# Patient Record
Sex: FEMALE | Race: WHITE | Employment: OTHER | URBAN - METROPOLITAN AREA
[De-identification: names, ages, dates, MRNs, and addresses within clinical notes are randomized per-mention and may not be internally consistent; named-entity substitution may affect disease eponyms.]

---

## 2018-09-24 ENCOUNTER — HOSPITAL ENCOUNTER (EMERGENCY)
Age: 83
Discharge: HOME | End: 2018-09-24
Attending: EMERGENCY MEDICINE
Payer: MEDICARE

## 2018-09-24 VITALS
WEIGHT: 125 LBS | TEMPERATURE: 98.6 F | OXYGEN SATURATION: 100 % | HEART RATE: 79 BPM | HEIGHT: 57 IN | RESPIRATION RATE: 18 BRPM | BODY MASS INDEX: 26.97 KG/M2 | DIASTOLIC BLOOD PRESSURE: 84 MMHG | SYSTOLIC BLOOD PRESSURE: 110 MMHG

## 2018-09-24 DIAGNOSIS — R11.2 NON-INTRACTABLE VOMITING WITH NAUSEA, UNSPECIFIED VOMITING TYPE: Primary | ICD-10-CM

## 2018-09-24 LAB
ALBUMIN SERPL-MCNC: 4.2 G/DL (ref 3.5–5)
ALBUMIN/GLOB SERPL: 1.1 {RATIO} (ref 1.1–2.2)
ALP SERPL-CCNC: 87 U/L (ref 45–117)
ALT SERPL-CCNC: 26 U/L (ref 12–78)
ANION GAP SERPL CALC-SCNC: 11 MMOL/L (ref 5–15)
AST SERPL-CCNC: 33 U/L (ref 15–37)
BASOPHILS # BLD: 0 K/UL (ref 0–0.1)
BASOPHILS NFR BLD: 0 % (ref 0–1)
BILIRUB SERPL-MCNC: 0.7 MG/DL (ref 0.2–1)
BUN SERPL-MCNC: 33 MG/DL (ref 6–20)
BUN/CREAT SERPL: 27 (ref 12–20)
CALCIUM SERPL-MCNC: 9.7 MG/DL (ref 8.5–10.1)
CHLORIDE SERPL-SCNC: 98 MMOL/L (ref 97–108)
CO2 SERPL-SCNC: 30 MMOL/L (ref 21–32)
COMMENT, HOLDF: NORMAL
CREAT SERPL-MCNC: 1.22 MG/DL (ref 0.55–1.02)
DIFFERENTIAL METHOD BLD: ABNORMAL
EOSINOPHIL # BLD: 0 K/UL (ref 0–0.4)
EOSINOPHIL NFR BLD: 0 % (ref 0–7)
ERYTHROCYTE [DISTWIDTH] IN BLOOD BY AUTOMATED COUNT: 12.9 % (ref 11.5–14.5)
GLOBULIN SER CALC-MCNC: 3.9 G/DL (ref 2–4)
GLUCOSE SERPL-MCNC: 137 MG/DL (ref 65–100)
HCT VFR BLD AUTO: 43.6 % (ref 35–47)
HGB BLD-MCNC: 15.1 G/DL (ref 11.5–16)
IMM GRANULOCYTES # BLD: 0 K/UL (ref 0–0.04)
IMM GRANULOCYTES NFR BLD AUTO: 0 % (ref 0–0.5)
LIPASE SERPL-CCNC: 407 U/L (ref 73–393)
LYMPHOCYTES # BLD: 1.1 K/UL (ref 0.8–3.5)
LYMPHOCYTES NFR BLD: 9 % (ref 12–49)
MCH RBC QN AUTO: 33 PG (ref 26–34)
MCHC RBC AUTO-ENTMCNC: 34.6 G/DL (ref 30–36.5)
MCV RBC AUTO: 95.4 FL (ref 80–99)
MONOCYTES # BLD: 0.7 K/UL (ref 0–1)
MONOCYTES NFR BLD: 6 % (ref 5–13)
NEUTS SEG # BLD: 10.7 K/UL (ref 1.8–8)
NEUTS SEG NFR BLD: 85 % (ref 32–75)
NRBC # BLD: 0 K/UL (ref 0–0.01)
NRBC BLD-RTO: 0 PER 100 WBC
PLATELET # BLD AUTO: 213 K/UL (ref 150–400)
PMV BLD AUTO: 10.7 FL (ref 8.9–12.9)
POTASSIUM SERPL-SCNC: 3.7 MMOL/L (ref 3.5–5.1)
PROT SERPL-MCNC: 8.1 G/DL (ref 6.4–8.2)
RBC # BLD AUTO: 4.57 M/UL (ref 3.8–5.2)
SAMPLES BEING HELD,HOLD: NORMAL
SODIUM SERPL-SCNC: 139 MMOL/L (ref 136–145)
WBC # BLD AUTO: 12.6 K/UL (ref 3.6–11)

## 2018-09-24 PROCEDURE — 96374 THER/PROPH/DIAG INJ IV PUSH: CPT

## 2018-09-24 PROCEDURE — 85025 COMPLETE CBC W/AUTO DIFF WBC: CPT | Performed by: EMERGENCY MEDICINE

## 2018-09-24 PROCEDURE — 83690 ASSAY OF LIPASE: CPT | Performed by: EMERGENCY MEDICINE

## 2018-09-24 PROCEDURE — 80053 COMPREHEN METABOLIC PANEL: CPT | Performed by: EMERGENCY MEDICINE

## 2018-09-24 PROCEDURE — 96361 HYDRATE IV INFUSION ADD-ON: CPT

## 2018-09-24 PROCEDURE — 74011250636 HC RX REV CODE- 250/636: Performed by: EMERGENCY MEDICINE

## 2018-09-24 PROCEDURE — 99282 EMERGENCY DEPT VISIT SF MDM: CPT

## 2018-09-24 RX ORDER — ONDANSETRON 2 MG/ML
4 INJECTION INTRAMUSCULAR; INTRAVENOUS
Status: COMPLETED | OUTPATIENT
Start: 2018-09-24 | End: 2018-09-24

## 2018-09-24 RX ORDER — ONDANSETRON 4 MG/1
4 TABLET, ORALLY DISINTEGRATING ORAL
Qty: 10 TAB | Refills: 0 | Status: SHIPPED | OUTPATIENT
Start: 2018-09-24 | End: 2022-09-22

## 2018-09-24 RX ADMIN — ONDANSETRON 4 MG: 2 INJECTION INTRAMUSCULAR; INTRAVENOUS at 22:11

## 2018-09-24 RX ADMIN — SODIUM CHLORIDE 1000 ML: 900 INJECTION, SOLUTION INTRAVENOUS at 22:11

## 2018-09-25 NOTE — ED PROVIDER NOTES
HPI Comments: 80 y.o. female with past medical history significant for BBB and heart failure who presents from home with chief complaint of vomiting. Patient states that she had constipation earlier today and took dulcolax for her symptoms. She comments that she should not have taken dulcolax with her PPI medicine as she had subsequent diarrhea and vomiting. She notes that she has had several episodes of simultaneous vomiting and diarrhea since 10:30 this morning. She claims her last episode of vomiting was over 4 hours ago. Patient also complains of associated chills, shakiness, diaphoresis, and pain at the bottom of her ribs that was present before and after her vomiting episodes. She mentions that she has been unable to eat or drink anything, including her Glucerna, today due to her symptoms. Patient currently denies nausea, rib pain, diaphoresis, and abdominal pain but is fatigued and has slight pain in her back. Patient denies hematochezia, blood in stool, hematemesis, headache, and leg pain. There are no other acute medical concerns at this time. Social hx: negative tobacco, alcohol, and drug use  PCP: Moraima Méndez MD    Note written by Bhavin Mcnamara, as dictated by Bernard Briscoe MD 9:41 PM          The history is provided by the patient and a relative. No  was used. Past Medical History:   Diagnosis Date    BBB (bundle branch block)     Heart failure ([facility] [address]     followed by Dr Albino Segal       Past Surgical History:   Procedure Laterality Date    CARDIAC SURG PROCEDURE UNLIST      stent placed     HX APPENDECTOMY      HX  SECTION      x 3    HX HEENT      both ears    HX PACEMAKER           History reviewed. No pertinent family history. Social History     Social History    Marital status:      Spouse name: N/A    Number of children: N/A    Years of education: N/A     Occupational History    Not on file.      Social History Main Topics    Smoking status: Never Smoker    Smokeless tobacco: Not on file    Alcohol use No    Drug use: No    Sexual activity: Not on file     Other Topics Concern    Not on file     Social History Narrative         ALLERGIES: Adhesive; Clindamycin; Hydrocodone; and Pcn [penicillins]    Review of Systems   Constitutional: Positive for chills and diaphoresis. Negative for activity change and fever. HENT: Negative for nosebleeds, sore throat, trouble swallowing and voice change. Eyes: Negative for visual disturbance. Respiratory: Negative for shortness of breath. Cardiovascular: Negative for chest pain and palpitations. Gastrointestinal: Positive for diarrhea, nausea and vomiting. Negative for abdominal pain, anal bleeding, blood in stool and constipation. Genitourinary: Negative for difficulty urinating, dysuria, hematuria and urgency. Musculoskeletal: Negative for back pain, neck pain and neck stiffness. Skin: Negative for color change. Allergic/Immunologic: Negative for immunocompromised state. Neurological: Negative for dizziness, seizures, syncope, weakness, light-headedness, numbness and headaches. Psychiatric/Behavioral: Negative for behavioral problems, confusion, hallucinations, self-injury and suicidal ideas. All other systems reviewed and are negative. Vitals:    09/24/18 1844   BP: 105/62   Pulse: 72   Resp: 16   Temp: 98.2 °F (36.8 °C)   SpO2: 98%   Weight: 56.7 kg (125 lb)   Height: 4' 9\" (1.448 m)            Physical Exam   Constitutional: She is oriented to person, place, and time. She appears well-developed and well-nourished. No distress. Elderly female; no acute distress   HENT:   Head: Normocephalic and atraumatic. Eyes: Pupils are equal, round, and reactive to light. Scleral icterus is present. Neck: Normal range of motion. Neck supple. Cardiovascular: Normal rate, regular rhythm and normal heart sounds.   Exam reveals no gallop and no friction rub. No murmur heard. Pulmonary/Chest: Effort normal and breath sounds normal. No respiratory distress. She has no wheezes. Clear breath sounds   Abdominal: Soft. Bowel sounds are normal. She exhibits no distension. There is no tenderness. There is no rebound and no guarding. Musculoskeletal: Normal range of motion. Neurological: She is alert and oriented to person, place, and time. Skin: Skin is warm. No rash noted. She is not diaphoretic. Profound jaundice   Psychiatric: She has a normal mood and affect. Her behavior is normal. Judgment and thought content normal.   Nursing note and vitals reviewed. Note written by Barbara Delgadillo, as dictated by Alistair Cox MD 9:41 PM    MDM  Number of Diagnoses or Management Options        ED Course     This is in a 43-year-old female with past medical history, review of systems, physical exam as above, presenting with complaints of increasing confusion, jaundice, scleral icterus, the setting of recently diagnosed biliary tree obstruction. Patient's daughter states his symptoms have been worsening over the last 2 weeks, states she was evaluated by her primary care physician, who ordered laboratory imaging, indicating elevated bilirubin, and CT findings of biliary obstruction, without conclusive cause. She is scheduled to have endoscopy in 2 days, however the daughter states the patient's symptoms seem to be worsening. Physical exam is remarkable for elderly female, in no acute distress, with profound jaundice, scleral icterus, otherwise unremarkable with clear breath sounds, soft nontender abdomen. Suspect worsening biliary process. Plan to obtain CMP, CBC, UA, lipase, and right upper quadrant ultrasound. We will reevaluate, and make a disposition based the patient's diagnostics and response to therapy, however she is likely going to require mission for further care and evaluation.     Procedures    11:02 PM  Patient with no further episodes of nausea, vomiting, diarrhea, tolerating PO w/o difficulty. Suspect viral gastroenteritis. Mildly elevated lipase however nontender abd. Will Dc home with antiemetics, PCP follow up and return precautions.

## 2018-09-25 NOTE — DISCHARGE INSTRUCTIONS
Nausea and Vomiting: Care Instructions  Your Care Instructions    When you are nauseated, you may feel weak and sweaty and notice a lot of saliva in your mouth. Nausea often leads to vomiting. Most of the time you do not need to worry about nausea and vomiting, but they can be signs of other illnesses. Two common causes of nausea and vomiting are stomach flu and food poisoning. Nausea and vomiting from viral stomach flu will usually start to improve within 24 hours. Nausea and vomiting from food poisoning may last from 12 to 48 hours. The doctor has checked you carefully, but problems can develop later. If you notice any problems or new symptoms, get medical treatment right away. Follow-up care is a key part of your treatment and safety. Be sure to make and go to all appointments, and call your doctor if you are having problems. It's also a good idea to know your test results and keep a list of the medicines you take. How can you care for yourself at home? · To prevent dehydration, drink plenty of fluids, enough so that your urine is light yellow or clear like water. Choose water and other caffeine-free clear liquids until you feel better. If you have kidney, heart, or liver disease and have to limit fluids, talk with your doctor before you increase the amount of fluids you drink. · Rest in bed until you feel better. · When you are able to eat, try clear soups, mild foods, and liquids until all symptoms are gone for 12 to 48 hours. Other good choices include dry toast, crackers, cooked cereal, and gelatin dessert, such as Jell-O. When should you call for help? Call 911 anytime you think you may need emergency care. For example, call if:    · You passed out (lost consciousness).    Call your doctor now or seek immediate medical care if:    · You have symptoms of dehydration, such as:  ¨ Dry eyes and a dry mouth. ¨ Passing only a little dark urine.   ¨ Feeling thirstier than usual.     · You have new or worsening belly pain.     · You have a new or higher fever.     · You vomit blood or what looks like coffee grounds.    Watch closely for changes in your health, and be sure to contact your doctor if:    · You have ongoing nausea and vomiting.     · Your vomiting is getting worse.     · Your vomiting lasts longer than 2 days.     · You are not getting better as expected. Where can you learn more? Go to http://laura-grover.info/. Enter 25 580702 in the search box to learn more about \"Nausea and Vomiting: Care Instructions. \"  Current as of: November 20, 2017  Content Version: 11.7  © 4501-2030 [employer]. Care instructions adapted under license by [employer] (which disclaims liability or warranty for this information). If you have questions about a medical condition or this instruction, always ask your healthcare professional. [address] any warranty or liability for your use of this information.

## 2018-12-15 ENCOUNTER — HOSPITAL ENCOUNTER (EMERGENCY)
Age: 83
Discharge: HOME | End: 2018-12-15
Attending: EMERGENCY MEDICINE
Payer: MEDICARE

## 2018-12-15 VITALS
TEMPERATURE: 97.9 F | DIASTOLIC BLOOD PRESSURE: 23 MMHG | OXYGEN SATURATION: 100 % | WEIGHT: 123 LBS | RESPIRATION RATE: 21 BRPM | HEART RATE: 80 BPM | SYSTOLIC BLOOD PRESSURE: 95 MMHG | HEIGHT: 57 IN | BODY MASS INDEX: 26.54 KG/M2

## 2018-12-15 DIAGNOSIS — R55 SYNCOPE AND COLLAPSE: Primary | ICD-10-CM

## 2018-12-15 LAB
ALBUMIN SERPL-MCNC: 3.2 G/DL (ref 3.5–5)
ALBUMIN/GLOB SERPL: 0.9 {RATIO} (ref 1.1–2.2)
ALP SERPL-CCNC: 72 U/L (ref 45–117)
ALT SERPL-CCNC: 18 U/L (ref 12–78)
ANION GAP SERPL CALC-SCNC: 7 MMOL/L (ref 5–15)
AST SERPL-CCNC: 20 U/L (ref 15–37)
BASOPHILS # BLD: 0 K/UL (ref 0–0.1)
BASOPHILS NFR BLD: 0 % (ref 0–1)
BILIRUB SERPL-MCNC: 0.5 MG/DL (ref 0.2–1)
BUN SERPL-MCNC: 24 MG/DL (ref 6–20)
BUN/CREAT SERPL: 21 (ref 12–20)
CALCIUM SERPL-MCNC: 9 MG/DL (ref 8.5–10.1)
CHLORIDE SERPL-SCNC: 104 MMOL/L (ref 97–108)
CO2 SERPL-SCNC: 27 MMOL/L (ref 21–32)
CREAT SERPL-MCNC: 1.17 MG/DL (ref 0.55–1.02)
DIFFERENTIAL METHOD BLD: NORMAL
EOSINOPHIL # BLD: 0.1 K/UL (ref 0–0.4)
EOSINOPHIL NFR BLD: 2 % (ref 0–7)
ERYTHROCYTE [DISTWIDTH] IN BLOOD BY AUTOMATED COUNT: 12.7 % (ref 11.5–14.5)
GLOBULIN SER CALC-MCNC: 3.4 G/DL (ref 2–4)
GLUCOSE SERPL-MCNC: 101 MG/DL (ref 65–100)
HCT VFR BLD AUTO: 37.8 % (ref 35–47)
HGB BLD-MCNC: 12.9 G/DL (ref 11.5–16)
IMM GRANULOCYTES # BLD: 0 K/UL (ref 0–0.04)
IMM GRANULOCYTES NFR BLD AUTO: 0 % (ref 0–0.5)
LYMPHOCYTES # BLD: 1.8 K/UL (ref 0.8–3.5)
LYMPHOCYTES NFR BLD: 26 % (ref 12–49)
MCH RBC QN AUTO: 32.3 PG (ref 26–34)
MCHC RBC AUTO-ENTMCNC: 34.1 G/DL (ref 30–36.5)
MCV RBC AUTO: 94.7 FL (ref 80–99)
MONOCYTES # BLD: 0.7 K/UL (ref 0–1)
MONOCYTES NFR BLD: 11 % (ref 5–13)
NEUTS SEG # BLD: 4.2 K/UL (ref 1.8–8)
NEUTS SEG NFR BLD: 61 % (ref 32–75)
NRBC # BLD: 0 K/UL (ref 0–0.01)
NRBC BLD-RTO: 0 PER 100 WBC
PLATELET # BLD AUTO: 209 K/UL (ref 150–400)
PMV BLD AUTO: 10.7 FL (ref 8.9–12.9)
POTASSIUM SERPL-SCNC: 4.2 MMOL/L (ref 3.5–5.1)
PROT SERPL-MCNC: 6.6 G/DL (ref 6.4–8.2)
RBC # BLD AUTO: 3.99 M/UL (ref 3.8–5.2)
SODIUM SERPL-SCNC: 138 MMOL/L (ref 136–145)
TROPONIN I SERPL-MCNC: <0.05 NG/ML
WBC # BLD AUTO: 6.9 K/UL (ref 3.6–11)

## 2018-12-15 PROCEDURE — 80053 COMPREHEN METABOLIC PANEL: CPT

## 2018-12-15 PROCEDURE — 99284 EMERGENCY DEPT VISIT MOD MDM: CPT

## 2018-12-15 PROCEDURE — 85025 COMPLETE CBC W/AUTO DIFF WBC: CPT

## 2018-12-15 PROCEDURE — 84484 ASSAY OF TROPONIN QUANT: CPT

## 2018-12-15 PROCEDURE — 36415 COLL VENOUS BLD VENIPUNCTURE: CPT

## 2018-12-15 PROCEDURE — 93005 ELECTROCARDIOGRAM TRACING: CPT

## 2018-12-15 NOTE — ED NOTES
Pt presents to ER via ems with c/o syncopal episode while standing in grocery store line. Pt did have loc and does not remember, was caught by a staff member and laid down to the ground.  states this has happened before in the past when she is overworked, states they did a lot of shopping this morning. .  Denies any pain

## 2018-12-15 NOTE — ED NOTES
Discharge instructions reviewed with patient, copy given by Dr. Daniele Beth. Pt is accomponied by family, denies use of wheelchair.

## 2018-12-15 NOTE — ED PROVIDER NOTES
EMERGENCY DEPARTMENT HISTORY AND PHYSICAL EXAM      Date: 12/15/2018  Patient Name: Katharina Rosen    History of Presenting Illness     Chief Complaint   Patient presents with    Syncope       History Provided By: Patient and Patient's Son    HPI: Katharina Rosen, 80 y.o. female with PMHx significant for CHF, recurrent syncope, presents via EMS to the ED with cc of syncope PTA. Pt reports that she was in the grocery store when she passed out, but was caught mid-fall by the grocery store pharmacist; pt did not hit her head. Pt denies feeling SOB, nauseous, dizzy, or having CP. Son reports pt has hx of frequent syncope, which she has had extensive work-up by her cardiologist, and has a baseline of low BP. Per grocery store protocol, pharmacist called EMS. Pt initially stated that she was fine and declined EMS transport, however, when son arrived and started leading pt into his car, pt began to doze off. EMS then asked pt if she wanted to go to the ED, but she was unable to respond, so they transported her to the ED. Pt denies increased BLE swelling, hx of PE, recent illnesses, dark or bloody bowel movements    Patient states she did not want to come to the ED in the first place, given that syncope is a recurrent issue    There are no other complaints, changes, or physical findings at this time. PCP: Leanne Nation MD    Social Hx:   Social History     Tobacco Use   Smoking Status Never Smoker   ,   Social History     Substance and Sexual Activity   Alcohol Use No   ,   Social History     Substance and Sexual Activity   Drug Use No       Past History     Past Surgical History:  Past Surgical History:   Procedure Laterality Date    CARDIAC SURG PROCEDURE UNLIST  2009    stent placed     HX APPENDECTOMY      HX  SECTION      x 3    HX HEENT      both ears    HX PACEMAKER         Family History:  No family history on file. Allergies:   Allergies   Allergen Reactions    Adhesive Rash    Clindamycin Rash    Hydrocodone Other (comments)     \"felt loopy\"    Pcn [Penicillins] Rash         Review of Systems   Review of Systems   Constitutional: Negative for chills and fever. HENT: Negative for congestion, rhinorrhea and sore throat. Respiratory: Negative for cough and shortness of breath. Cardiovascular: Negative for chest pain. Gastrointestinal: Negative for abdominal pain, nausea and vomiting. Genitourinary: Negative for dysuria and urgency. Skin: Negative for rash. Neurological: Positive for syncope. Negative for dizziness, light-headedness and headaches. All other systems reviewed and are negative. Physical Exam   Physical Exam   Constitutional: She is oriented to person, place, and time. She appears well-developed and well-nourished. No distress. HENT:   Head: Normocephalic and atraumatic. Eyes: Conjunctivae and EOM are normal. Pupils are equal, round, and reactive to light. Neck: Normal range of motion. Cardiovascular: Normal rate, regular rhythm and intact distal pulses. Pulmonary/Chest: Effort normal and breath sounds normal. No stridor. No respiratory distress. Pacemaker in L chest wall   Abdominal: Soft. She exhibits no distension. There is no tenderness. Musculoskeletal: Normal range of motion. Neurological: She is alert and oriented to person, place, and time. Skin: Skin is warm and dry. Psychiatric: She has a normal mood and affect. Nursing note and vitals reviewed.       Diagnostic Study Results     Labs -     Recent Results (from the past 12 hour(s))   EKG, 12 LEAD, INITIAL    Collection Time: 12/15/18 12:53 PM   Result Value Ref Range    Ventricular Rate 79 BPM    Atrial Rate 78 BPM    QRS Duration 140 ms    Q-T Interval 492 ms    QTC Calculation (Bezet) 564 ms    Calculated R Axis -75 degrees    Calculated T Axis 107 degrees    Diagnosis       Ventricular-paced rhythm  Biventricular pacemaker detected  When compared with ECG of 06-APR-2012 08:01,  No significant change was found     CBC WITH AUTOMATED DIFF    Collection Time: 12/15/18  1:10 PM   Result Value Ref Range    WBC 6.9 3.6 - 11.0 K/uL    RBC 3.99 3.80 - 5.20 M/uL    HGB 12.9 11.5 - 16.0 g/dL    HCT 37.8 35.0 - 47.0 %    MCV 94.7 80.0 - 99.0 FL    MCH 32.3 26.0 - 34.0 PG    MCHC 34.1 30.0 - 36.5 g/dL    RDW 12.7 11.5 - 14.5 %    PLATELET 170 885 - 889 K/uL    MPV 10.7 8.9 - 12.9 FL    NRBC 0.0 0  WBC    ABSOLUTE NRBC 0.00 0.00 - 0.01 K/uL    NEUTROPHILS 61 32 - 75 %    LYMPHOCYTES 26 12 - 49 %    MONOCYTES 11 5 - 13 %    EOSINOPHILS 2 0 - 7 %    BASOPHILS 0 0 - 1 %    IMMATURE GRANULOCYTES 0 0.0 - 0.5 %    ABS. NEUTROPHILS 4.2 1.8 - 8.0 K/UL    ABS. LYMPHOCYTES 1.8 0.8 - 3.5 K/UL    ABS. MONOCYTES 0.7 0.0 - 1.0 K/UL    ABS. EOSINOPHILS 0.1 0.0 - 0.4 K/UL    ABS. BASOPHILS 0.0 0.0 - 0.1 K/UL    ABS. IMM. GRANS. 0.0 0.00 - 0.04 K/UL    DF AUTOMATED     METABOLIC PANEL, COMPREHENSIVE    Collection Time: 12/15/18  1:10 PM   Result Value Ref Range    Sodium 138 136 - 145 mmol/L    Potassium 4.2 3.5 - 5.1 mmol/L    Chloride 104 97 - 108 mmol/L    CO2 27 21 - 32 mmol/L    Anion gap 7 5 - 15 mmol/L    Glucose 101 (H) 65 - 100 mg/dL    BUN 24 (H) 6 - 20 MG/DL    Creatinine 1.17 (H) 0.55 - 1.02 MG/DL    BUN/Creatinine ratio 21 (H) 12 - 20      GFR est AA 53 (L) >60 ml/min/1.73m2    GFR est non-AA 44 (L) >60 ml/min/1.73m2    Calcium 9.0 8.5 - 10.1 MG/DL    Bilirubin, total 0.5 0.2 - 1.0 MG/DL    ALT (SGPT) 18 12 - 78 U/L    AST (SGOT) 20 15 - 37 U/L    Alk. phosphatase 72 45 - 117 U/L    Protein, total 6.6 6.4 - 8.2 g/dL    Albumin 3.2 (L) 3.5 - 5.0 g/dL    Globulin 3.4 2.0 - 4.0 g/dL    A-G Ratio 0.9 (L) 1.1 - 2.2     TROPONIN I    Collection Time: 12/15/18  1:10 PM   Result Value Ref Range    Troponin-I, Qt. <0.05 <0.05 ng/mL     Medical Decision Making   I am the first provider for this patient.     I reviewed the vital signs, available nursing notes, past medical history, past surgical history, family history and social history. Vital Signs-Reviewed the patient's vital signs. Patient Vitals for the past 12 hrs:   Temp Pulse Resp BP SpO2   12/15/18 1337  82  91/65 100 %   12/15/18 1258 97.9 °F (36.6 °C) 76 14 95/57 97 %       Pulse Oximetry Analysis - 100% on RA    EKG interpretation: (Preliminary) 12:53  Rhythm: ventricular-paced; and regular . Rate (approx.): 79; Axis: normal; LA interval: normal; QRS interval: normal ; ST/T wave: normal; Other findings: non-ischemic. Written by Mu Novak, ED Scribe, as dictated by Alesia Cleveland MD.    Records Reviewed: Nursing Notes and Old Medical Records    Provider Notes (Medical Decision Making):   DDx: Vasovagal syncope, arrhythmia, electrolyte imbalance    Patient well appearing on exam. BP in the 90s, which is chronic. Will check orthostatics, basic labs, troponin, ekg. ED Course:   Initial assessment performed. The patients presenting problems have been discussed, and they are in agreement with the care plan formulated and outlined with them. I have encouraged them to ask questions as they arise throughout their visit. Orthostatics negative. Labs wnl. Offered to interrogate pacemaker, pt prefers to call her cardiologist for f/u. Requesting d/c. Given prior episodes of similar syncope which pt reports have been fully worked up, I think this is reasonable. Return precautions discussed    Critical Care Time:   0 minutes. Disposition:  Discharge Note:  2:13 PM  The patient has been re-evaluated and is ready for discharge. Reviewed available results with patient. Counseled patient/parent/guardian on diagnosis and care plan. Patient has expressed understanding, and all questions have been answered. Patient agrees with plan and agrees to follow up as recommended, or return to the ED if their symptoms worsen. Discharge instructions have been provided and explained to the patient, along with reasons to return to the ED. PLAN:  1. Follow-up Information     Follow up With Specialties Details Why Contact Info    Romana Daily MD [facility] Schedule an appointment as soon as possible for a visit  [address]  [address] [facility]      Jim Mena MD Cardiology Schedule an appointment as soon as possible for a visit  [facility]  [address]  [facility] [phone]  [phone]      [facility] EMERGENCY DEPT Emergency Medicine  As needed, If symptoms worsen [address] [place] [address]  [phone]        Return to ED if worse     Diagnosis     Clinical Impression:   1. Syncope and collapse        Attestations: This note is prepared by Ye Baldwin, acting as scribe for MD Jim High MD: The scribe's documentation has been prepared under my direction and personally reviewed by me in its entirety. I confirm that the note above accurately reflects all work, treatment, procedures, and medical decision making performed by me.

## 2018-12-15 NOTE — DISCHARGE INSTRUCTIONS
Fainting: Care Instructions  Your Care Instructions    When you faint, or pass out, you lose consciousness for a short time. A brief drop in blood flow to the brain often causes it. When you fall or lie down, more blood flows to your brain and you regain consciousness. Emotional stress, pain, or overheating--especially if you have been standing--can make you faint. In these cases, fainting is usually not serious. But fainting can be a sign of a more serious problem. Your doctor may want you to have more tests to rule out other causes. The treatment you need depends on the reason why you fainted. The doctor has checked you carefully, but problems can develop later. If you notice any problems or new symptoms, get medical treatment right away. Follow-up care is a key part of your treatment and safety. Be sure to make and go to all appointments, and call your doctor if you are having problems. It's also a good idea to know your test results and keep a list of the medicines you take. How can you care for yourself at home? · Drink plenty of fluids to prevent dehydration. If you have kidney, heart, or liver disease and have to limit fluids, talk with your doctor before you increase your fluid intake. When should you call for help? Call 911 anytime you think you may need emergency care. For example, call if:    · You have symptoms of a heart problem. These may include:  ? Chest pain or pressure. ? Severe trouble breathing. ? A fast or irregular heartbeat. ? Lightheadedness or sudden weakness. ? Coughing up pink, foamy mucus. ? Passing out. After you call 911, the  may tell you to chew 1 adult-strength or 2 to 4 low-dose aspirin. Wait for an ambulance. Do not try to drive yourself.     · You have symptoms of a stroke. These may include:  ? Sudden numbness, tingling, weakness, or loss of movement in your face, arm, or leg, especially on only one side of your body. ? Sudden vision changes.   ? Sudden trouble speaking. ? Sudden confusion or trouble understanding simple statements. ? Sudden problems with walking or balance. ? A sudden, severe headache that is different from past headaches.     · You passed out (lost consciousness) again.    Watch closely for changes in your health, and be sure to contact your doctor if:    · You do not get better as expected. Where can you learn more? Go to http://laura-grover.info/. Enter Z468 in the search box to learn more about \"Fainting: Care Instructions. \"  Current as of: November 20, 2017  Content Version: 11.8  © 6398-4690 [employer]. Care instructions adapted under license by [employer] (which disclaims liability or warranty for this information). If you have questions about a medical condition or this instruction, always ask your healthcare professional. [address] any warranty or liability for your use of this information.

## 2018-12-16 LAB
ATRIAL RATE: 78 BPM
CALCULATED R AXIS, ECG10: -75 DEGREES
CALCULATED T AXIS, ECG11: 107 DEGREES
DIAGNOSIS, 93000: NORMAL
Q-T INTERVAL, ECG07: 492 MS
QRS DURATION, ECG06: 140 MS
QTC CALCULATION (BEZET), ECG08: 564 MS
VENTRICULAR RATE, ECG03: 79 BPM

## 2022-06-29 NOTE — ED NOTES
Health Maintenance Due   Topic Date Due   • Medicare Advantage- Medicare Wellness Visit  01/01/2022       Patient is due for topics as listed above but is not proceeding with MWV (Medicare Wellness Visit) at this time.  Appt scheduled to perform MWV (Medicare Wellness Visit).      Recent PHQ 2/9 Score    PHQ 2:  Date Adult PHQ 2 Score Adult PHQ 2 Interpretation   5/20/2022 0 No further screening needed       PHQ 9:          I have reviewed discharge instructions with the patient. The patient verbalized understanding.

## 2022-09-21 ENCOUNTER — APPOINTMENT (OUTPATIENT)
Dept: GENERAL RADIOLOGY | Age: 87
DRG: 641 | End: 2022-09-21
Attending: PHYSICIAN ASSISTANT
Payer: MEDICARE

## 2022-09-21 ENCOUNTER — APPOINTMENT (OUTPATIENT)
Dept: GENERAL RADIOLOGY | Age: 87
DRG: 641 | End: 2022-09-21
Attending: STUDENT IN AN ORGANIZED HEALTH CARE EDUCATION/TRAINING PROGRAM
Payer: MEDICARE

## 2022-09-21 ENCOUNTER — APPOINTMENT (OUTPATIENT)
Dept: CT IMAGING | Age: 87
DRG: 641 | End: 2022-09-21
Attending: STUDENT IN AN ORGANIZED HEALTH CARE EDUCATION/TRAINING PROGRAM
Payer: MEDICARE

## 2022-09-21 ENCOUNTER — HOSPITAL ENCOUNTER (INPATIENT)
Age: 87
LOS: 4 days | Discharge: HOME HEALTH | DRG: 641 | End: 2022-09-26
Attending: STUDENT IN AN ORGANIZED HEALTH CARE EDUCATION/TRAINING PROGRAM | Admitting: HOSPITALIST
Payer: MEDICARE

## 2022-09-21 DIAGNOSIS — I21.4 NSTEMI (NON-ST ELEVATED MYOCARDIAL INFARCTION) (HCC): ICD-10-CM
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
## 2022-09-21 LAB
ALBUMIN SERPL-MCNC: 3.2 G/DL (ref 3.5–5)
ALBUMIN/GLOB SERPL: 0.8 {RATIO} (ref 1.1–2.2)
ALP SERPL-CCNC: 71 U/L (ref 45–117)
ALT SERPL-CCNC: 22 U/L (ref 12–78)
ANION GAP SERPL CALC-SCNC: 6 MMOL/L (ref 5–15)
APPEARANCE UR: CLEAR
AST SERPL-CCNC: 34 U/L (ref 15–37)
ATRIAL RATE: 93 BPM
BACTERIA URNS QL MICRO: NEGATIVE /HPF
BASOPHILS # BLD: 0 K/UL (ref 0–0.1)
BASOPHILS NFR BLD: 0 % (ref 0–1)
BILIRUB SERPL-MCNC: 0.6 MG/DL (ref 0.2–1)
BILIRUB UR QL: NEGATIVE
BUN SERPL-MCNC: 77 MG/DL (ref 6–20)
BUN/CREAT SERPL: 53 (ref 12–20)
CALCIUM SERPL-MCNC: 9.8 MG/DL (ref 8.5–10.1)
CALCULATED R AXIS, ECG10: -79 DEGREES
CALCULATED T AXIS, ECG11: 89 DEGREES
CHLORIDE SERPL-SCNC: 97 MMOL/L (ref 97–108)
CK SERPL-CCNC: 177 U/L (ref 26–192)
CO2 SERPL-SCNC: 35 MMOL/L (ref 21–32)
COLOR UR: ABNORMAL
CREAT SERPL-MCNC: 1.44 MG/DL (ref 0.55–1.02)
DIAGNOSIS, 93000: NORMAL
DIFFERENTIAL METHOD BLD: ABNORMAL
EOSINOPHIL # BLD: 0 K/UL (ref 0–0.4)
EOSINOPHIL NFR BLD: 0 % (ref 0–7)
EPITH CASTS URNS QL MICRO: ABNORMAL /LPF
ERYTHROCYTE [DISTWIDTH] IN BLOOD BY AUTOMATED COUNT: 13.4 % (ref 11.5–14.5)
GLOBULIN SER CALC-MCNC: 4 G/DL (ref 2–4)
GLUCOSE SERPL-MCNC: 124 MG/DL (ref 65–100)
GLUCOSE UR STRIP.AUTO-MCNC: NEGATIVE MG/DL
HCT VFR BLD AUTO: 42.3 % (ref 35–47)
HGB BLD-MCNC: 14.7 G/DL (ref 11.5–16)
HGB UR QL STRIP: NEGATIVE
HYALINE CASTS URNS QL MICRO: ABNORMAL /LPF (ref 0–2)
IMM GRANULOCYTES # BLD AUTO: 0 K/UL (ref 0–0.04)
IMM GRANULOCYTES NFR BLD AUTO: 0 % (ref 0–0.5)
KETONES UR QL STRIP.AUTO: NEGATIVE MG/DL
LEUKOCYTE ESTERASE UR QL STRIP.AUTO: ABNORMAL
LYMPHOCYTES # BLD: 1.6 K/UL (ref 0.8–3.5)
LYMPHOCYTES NFR BLD: 17 % (ref 12–49)
MAGNESIUM SERPL-MCNC: 2.2 MG/DL (ref 1.6–2.4)
MCH RBC QN AUTO: 31.8 PG (ref 26–34)
MCHC RBC AUTO-ENTMCNC: 34.8 G/DL (ref 30–36.5)
MCV RBC AUTO: 91.6 FL (ref 80–99)
MONOCYTES # BLD: 1.1 K/UL (ref 0–1)
MONOCYTES NFR BLD: 11 % (ref 5–13)
NEUTS SEG # BLD: 6.9 K/UL (ref 1.8–8)
NEUTS SEG NFR BLD: 72 % (ref 32–75)
NITRITE UR QL STRIP.AUTO: NEGATIVE
NRBC # BLD: 0 K/UL (ref 0–0.01)
NRBC BLD-RTO: 0 PER 100 WBC
PH UR STRIP: 5.5 [PH] (ref 5–8)
PLATELET # BLD AUTO: 155 K/UL (ref 150–400)
PMV BLD AUTO: 10.6 FL (ref 8.9–12.9)
POTASSIUM SERPL-SCNC: 3.3 MMOL/L (ref 3.5–5.1)
PROT SERPL-MCNC: 7.2 G/DL (ref 6.4–8.2)
PROT UR STRIP-MCNC: NEGATIVE MG/DL
Q-T INTERVAL, ECG07: 460 MS
QRS DURATION, ECG06: 134 MS
QTC CALCULATION (BEZET), ECG08: 530 MS
RBC # BLD AUTO: 4.62 M/UL (ref 3.8–5.2)
RBC #/AREA URNS HPF: ABNORMAL /HPF (ref 0–5)
SODIUM SERPL-SCNC: 138 MMOL/L (ref 136–145)
SP GR UR REFRACTOMETRY: 1.02
TROPONIN-HIGH SENSITIVITY: 112 NG/L (ref 0–51)
TROPONIN-HIGH SENSITIVITY: 137 NG/L (ref 0–51)
UA: UC IF INDICATED,UAUC: ABNORMAL
UROBILINOGEN UR QL STRIP.AUTO: 0.2 EU/DL (ref 0.2–1)
VENTRICULAR RATE, ECG03: 80 BPM
WBC # BLD AUTO: 9.7 K/UL (ref 3.6–11)
WBC URNS QL MICRO: ABNORMAL /HPF (ref 0–4)

## 2022-09-21 PROCEDURE — 82550 ASSAY OF CK (CPK): CPT

## 2022-09-21 PROCEDURE — 73562 X-RAY EXAM OF KNEE 3: CPT

## 2022-09-21 PROCEDURE — 74011250636 HC RX REV CODE- 250/636: Performed by: PHYSICIAN ASSISTANT

## 2022-09-21 PROCEDURE — 84484 ASSAY OF TROPONIN QUANT: CPT

## 2022-09-21 PROCEDURE — 85025 COMPLETE CBC W/AUTO DIFF WBC: CPT

## 2022-09-21 PROCEDURE — 72170 X-RAY EXAM OF PELVIS: CPT

## 2022-09-21 PROCEDURE — 70450 CT HEAD/BRAIN W/O DYE: CPT

## 2022-09-21 PROCEDURE — 81001 URINALYSIS AUTO W/SCOPE: CPT

## 2022-09-21 PROCEDURE — 74011000250 HC RX REV CODE- 250: Performed by: PHYSICIAN ASSISTANT

## 2022-09-21 PROCEDURE — 73080 X-RAY EXAM OF ELBOW: CPT

## 2022-09-21 PROCEDURE — 36415 COLL VENOUS BLD VENIPUNCTURE: CPT

## 2022-09-21 PROCEDURE — 99285 EMERGENCY DEPT VISIT HI MDM: CPT

## 2022-09-21 PROCEDURE — 83735 ASSAY OF MAGNESIUM: CPT

## 2022-09-21 PROCEDURE — 74011250637 HC RX REV CODE- 250/637: Performed by: PHYSICIAN ASSISTANT

## 2022-09-21 PROCEDURE — 80053 COMPREHEN METABOLIC PANEL: CPT

## 2022-09-21 PROCEDURE — G0378 HOSPITAL OBSERVATION PER HR: HCPCS

## 2022-09-21 PROCEDURE — 93005 ELECTROCARDIOGRAM TRACING: CPT

## 2022-09-21 PROCEDURE — 71045 X-RAY EXAM CHEST 1 VIEW: CPT

## 2022-09-21 RX ORDER — ACETAMINOPHEN 650 MG/1
650 SUPPOSITORY RECTAL
Status: DISCONTINUED | OUTPATIENT
Start: 2022-09-21 | End: 2022-09-26 | Stop reason: HOSPADM

## 2022-09-21 RX ORDER — POLYETHYLENE GLYCOL 3350 17 G/17G
17 POWDER, FOR SOLUTION ORAL DAILY PRN
Status: DISCONTINUED | OUTPATIENT
Start: 2022-09-21 | End: 2022-09-26 | Stop reason: HOSPADM

## 2022-09-21 RX ORDER — MIDODRINE HYDROCHLORIDE 5 MG/1
5 TABLET ORAL
Status: DISCONTINUED | OUTPATIENT
Start: 2022-09-21 | End: 2022-09-26 | Stop reason: HOSPADM

## 2022-09-21 RX ORDER — ACETAMINOPHEN 325 MG/1
650 TABLET ORAL
Status: DISCONTINUED | OUTPATIENT
Start: 2022-09-21 | End: 2022-09-26 | Stop reason: HOSPADM

## 2022-09-21 RX ORDER — ONDANSETRON 2 MG/ML
4 INJECTION INTRAMUSCULAR; INTRAVENOUS
Status: DISCONTINUED | OUTPATIENT
Start: 2022-09-21 | End: 2022-09-26 | Stop reason: HOSPADM

## 2022-09-21 RX ORDER — SODIUM CHLORIDE 0.9 % (FLUSH) 0.9 %
5-40 SYRINGE (ML) INJECTION EVERY 8 HOURS
Status: DISCONTINUED | OUTPATIENT
Start: 2022-09-21 | End: 2022-09-26 | Stop reason: HOSPADM

## 2022-09-21 RX ORDER — POTASSIUM CHLORIDE 750 MG/1
40 TABLET, FILM COATED, EXTENDED RELEASE ORAL
Status: COMPLETED | OUTPATIENT
Start: 2022-09-21 | End: 2022-09-21

## 2022-09-21 RX ORDER — SODIUM CHLORIDE 0.9 % (FLUSH) 0.9 %
5-40 SYRINGE (ML) INJECTION AS NEEDED
Status: DISCONTINUED | OUTPATIENT
Start: 2022-09-21 | End: 2022-09-26 | Stop reason: HOSPADM

## 2022-09-21 RX ORDER — ONDANSETRON 4 MG/1
4 TABLET, ORALLY DISINTEGRATING ORAL
Status: DISCONTINUED | OUTPATIENT
Start: 2022-09-21 | End: 2022-09-26 | Stop reason: HOSPADM

## 2022-09-21 RX ORDER — SODIUM CHLORIDE 9 MG/ML
75 INJECTION, SOLUTION INTRAVENOUS CONTINUOUS
Status: DISCONTINUED | OUTPATIENT
Start: 2022-09-21 | End: 2022-09-22

## 2022-09-21 RX ADMIN — POTASSIUM CHLORIDE 40 MEQ: 750 TABLET, FILM COATED, EXTENDED RELEASE ORAL at 14:14

## 2022-09-21 RX ADMIN — SODIUM CHLORIDE 75 ML/HR: 9 INJECTION, SOLUTION INTRAVENOUS at 14:14

## 2022-09-21 RX ADMIN — SODIUM CHLORIDE, PRESERVATIVE FREE 10 ML: 5 INJECTION INTRAVENOUS at 18:36

## 2022-09-21 NOTE — ED PROVIDER NOTES
EMERGENCY DEPARTMENT HISTORY AND PHYSICAL EXAM      Date: 9/21/2022  Patient Name: Jey Kaplan    History of Presenting Illness     Chief Complaint   Patient presents with    Fall     Pt had two falls in two days. Niece went to patients house and found her on the floor. Pt lives alone  Pt reports she doesn't remember either fall. She has no visible injury or complaints. Pt is alert and oriented x4. History Provided By: Patient    HPI: Jey Kaplan, 80 y.o. female presents to the ED with cc of multiple falls. Patient's niece states that patient fell yesterday, they were able to get her off the floor and put her back in bed. They found her in the hallway. She had another fall last night and they found her this morning also in the hallway. Patient does not remember either fall. She states she feels well now. She denies any recent chest pain, abdominal pain, shortness of breath. She states that she has had pacemaker that is adjusted by Dr. Saul Stone. She denies any headache, nausea, vomiting. She does have bruising to her bilateral elbows but denies any significant pain. On Plavix. States she lives by herself and other than recently has been performing her ADL's without difficutly. There are no other complaints, changes, or physical findings at this time. PCP: Waleska Perez MD    No current facility-administered medications on file prior to encounter. Current Outpatient Medications on File Prior to Encounter   Medication Sig Dispense Refill    ondansetron (ZOFRAN ODT) 4 mg disintegrating tablet Take 1 Tab by mouth every eight (8) hours as needed for Nausea. 10 Tab 0    furosemide (LASIX) 40 mg tablet Take 40 mg by mouth daily.  rosuvastatin (CRESTOR) 5 mg tablet Take 5 mg by mouth nightly.  clopidogrel (PLAVIX) 75 mg tablet Take  by mouth daily.  lansoprazole (PREVACID) 15 mg capsule Take  by mouth Daily (before breakfast).         carvedilol (COREG) 6.25 mg tablet Take 3.125 mg by mouth two (2) times daily (with meals).  ramipril (ALTACE) 2.5 mg capsule Take  by mouth daily.  aspirin delayed-release 81 mg tablet Take 162 mg by mouth daily. Past History     Past Medical History:  Past Medical History:   Diagnosis Date    BBB (bundle branch block)     Heart failure ([address]     followed by Dr Kayleigh Salinas       Past Surgical History:  Past Surgical History:   Procedure Laterality Date    CARDIAC SURG PROCEDURE UNLIST  2009    stent placed     HX APPENDECTOMY      HX  SECTION      x 3    HX HEENT      both ears    HX PACEMAKER         Family History:  No family history on file. Social History:  Social History     Tobacco Use    Smoking status: Never   Substance Use Topics    Alcohol use: No    Drug use: No       Allergies: Allergies   Allergen Reactions    Adhesive Rash    Clindamycin Rash    Hydrocodone Other (comments)     \"felt loopy\"    Pcn [Penicillins] Rash         Review of Systems   Review of Systems   Constitutional:  Negative for chills and fever. HENT:  Negative for rhinorrhea and sore throat. Respiratory:  Negative for cough and shortness of breath. Cardiovascular:  Negative for chest pain. Gastrointestinal:  Negative for abdominal pain, diarrhea, nausea and vomiting. Genitourinary:  Negative for dysuria and hematuria. Musculoskeletal:  Negative for arthralgias and myalgias. Neurological:  Negative for dizziness, seizures and weakness. All other systems reviewed and are negative. Physical Exam   Physical Exam  Vitals and nursing note reviewed. Constitutional:       Appearance: She is well-developed. HENT:      Head: Normocephalic and atraumatic. Mouth/Throat:      Mouth: Mucous membranes are dry. Cardiovascular:      Rate and Rhythm: Normal rate and regular rhythm. Pulmonary:      Effort: Pulmonary effort is normal.      Breath sounds: Normal breath sounds.    Abdominal: General: There is no distension. Palpations: Abdomen is soft. Musculoskeletal:         General: Normal range of motion. Cervical back: Neck supple. Skin:     General: Skin is warm and dry. Comments: Bruising to bilateral elbows, small abrasion over left elbow   Neurological:      Mental Status: She is alert. Psychiatric:         Mood and Affect: Mood normal.         Behavior: Behavior normal.       Diagnostic Study Results     Labs -     Recent Results (from the past 12 hour(s))   EKG, 12 LEAD, INITIAL    Collection Time: 09/21/22 10:06 AM   Result Value Ref Range    Ventricular Rate 80 BPM    Atrial Rate 93 BPM    QRS Duration 134 ms    Q-T Interval 460 ms    QTC Calculation (Bezet) 530 ms    Calculated R Axis -79 degrees    Calculated T Axis 89 degrees    Diagnosis       Ventricular-paced rhythm  Biventricular pacemaker detected  When compared with ECG of 15-DEC-2018 12:53,  No significant change was found  Confirmed by Kayleen Babb ([id number]) on 9/21/2022 12:33:37 PM     CBC WITH AUTOMATED DIFF    Collection Time: 09/21/22 10:17 AM   Result Value Ref Range    WBC 9.7 3.6 - 11.0 K/uL    RBC 4.62 3.80 - 5.20 M/uL    HGB 14.7 11.5 - 16.0 g/dL    HCT 42.3 35.0 - 47.0 %    MCV 91.6 80.0 - 99.0 FL    MCH 31.8 26.0 - 34.0 PG    MCHC 34.8 30.0 - 36.5 g/dL    RDW 13.4 11.5 - 14.5 %    PLATELET 458 088 - 190 K/uL    MPV 10.6 8.9 - 12.9 FL    NRBC 0.0 0  WBC    ABSOLUTE NRBC 0.00 0.00 - 0.01 K/uL    NEUTROPHILS 72 32 - 75 %    LYMPHOCYTES 17 12 - 49 %    MONOCYTES 11 5 - 13 %    EOSINOPHILS 0 0 - 7 %    BASOPHILS 0 0 - 1 %    IMMATURE GRANULOCYTES 0 0.0 - 0.5 %    ABS. NEUTROPHILS 6.9 1.8 - 8.0 K/UL    ABS. LYMPHOCYTES 1.6 0.8 - 3.5 K/UL    ABS. MONOCYTES 1.1 (H) 0.0 - 1.0 K/UL    ABS. EOSINOPHILS 0.0 0.0 - 0.4 K/UL    ABS. BASOPHILS 0.0 0.0 - 0.1 K/UL    ABS. IMM.  GRANS. 0.0 0.00 - 0.04 K/UL    DF AUTOMATED     METABOLIC PANEL, COMPREHENSIVE    Collection Time: 09/21/22 10:17 AM   Result Value Ref Range    Sodium 138 136 - 145 mmol/L    Potassium 3.3 (L) 3.5 - 5.1 mmol/L    Chloride 97 97 - 108 mmol/L    CO2 35 (H) 21 - 32 mmol/L    Anion gap 6 5 - 15 mmol/L    Glucose 124 (H) 65 - 100 mg/dL    BUN 77 (H) 6 - 20 MG/DL    Creatinine 1.44 (H) 0.55 - 1.02 MG/DL    BUN/Creatinine ratio 53 (H) 12 - 20      GFR est AA 41 (L) >60 ml/min/1.73m2    GFR est non-AA 34 (L) >60 ml/min/1.73m2    Calcium 9.8 8.5 - 10.1 MG/DL    Bilirubin, total 0.6 0.2 - 1.0 MG/DL    ALT (SGPT) 22 12 - 78 U/L    AST (SGOT) 34 15 - 37 U/L    Alk. phosphatase 71 45 - 117 U/L    Protein, total 7.2 6.4 - 8.2 g/dL    Albumin 3.2 (L) 3.5 - 5.0 g/dL    Globulin 4.0 2.0 - 4.0 g/dL    A-G Ratio 0.8 (L) 1.1 - 2.2     TROPONIN-HIGH SENSITIVITY    Collection Time: 09/21/22 10:17 AM   Result Value Ref Range    Troponin-High Sensitivity 137 (HH) 0 - 51 ng/L   MAGNESIUM    Collection Time: 09/21/22 10:17 AM   Result Value Ref Range    Magnesium 2.2 1.6 - 2.4 mg/dL   CK    Collection Time: 09/21/22 10:17 AM   Result Value Ref Range     26 - 192 U/L   TROPONIN-HIGH SENSITIVITY    Collection Time: 09/21/22  1:08 PM   Result Value Ref Range    Troponin-High Sensitivity 112 (H) 0 - 51 ng/L   URINALYSIS W/ REFLEX CULTURE    Collection Time: 09/21/22  2:35 PM    Specimen: Urine   Result Value Ref Range    Color YELLOW/STRAW      Appearance CLEAR CLEAR      Specific gravity 1.016      pH (UA) 5.5 5.0 - 8.0      Protein Negative NEG mg/dL    Glucose Negative NEG mg/dL    Ketone Negative NEG mg/dL    Bilirubin Negative NEG      Blood Negative NEG      Urobilinogen 0.2 0.2 - 1.0 EU/dL    Nitrites Negative NEG      Leukocyte Esterase TRACE (A) NEG      UA:UC IF INDICATED CULTURE NOT INDICATED BY UA RESULT CNI      WBC 0-4 0 - 4 /hpf    RBC 0-5 0 - 5 /hpf    Epithelial cells FEW FEW /lpf    Bacteria Negative NEG /hpf    Hyaline cast 0-2 0 - 2 /lpf       Radiologic Studies -   XR CHEST PORT   Final Result   No acute process.       CT HEAD WO CONT   Final Result   No acute changes. XR ELBOW LT MIN 3 V   Final Result   No acute abnormality. XR ELBOW RT MIN 3 V   Final Result   No acute abnormality. CT Results  (Last 48 hours)                 09/21/22 1058  CT HEAD WO CONT Final result    Impression:  No acute changes. Narrative:  EXAM: CT HEAD WO CONT       INDICATION: fall       COMPARISON: None. CONTRAST: None. TECHNIQUE: Unenhanced CT of the head was performed using 5 mm images. Brain and   bone windows were generated. Coronal and sagittal reformats. CT dose reduction   was achieved through use of a standardized protocol tailored for this   examination and automatic exposure control for dose modulation. FINDINGS:   No extra-axial fluid collection hemorrhage shift or masses. Atrophy and   nonspecific white matter changes. CXR Results  (Last 48 hours)                 09/21/22 1507  XR CHEST PORT Final result    Impression:  No acute process. Narrative:  INDICATION: SOB       EXAM:  AP CHEST RADIOGRAPH       COMPARISON: April 6, 2012       FINDINGS:       AP portable view of the chest demonstrates left subclavian pacemaker. Normal   heart size. There is no edema, effusion, consolidation, or pneumothorax. The   osseous structures are unremarkable. Medical Decision Making   I am the first provider for this patient. I reviewed the vital signs, available nursing notes, past medical history, past surgical history, family history and social history. Vital Signs-Reviewed the patient's vital signs.   Patient Vitals for the past 12 hrs:   Temp Pulse Resp BP SpO2   09/21/22 1628 98.5 °F (36.9 °C) 80 16 98/65 100 %   09/21/22 1610 -- 80 15 (!) 90/44 95 %   09/21/22 1314 -- 80 15 92/61 91 %   09/21/22 1300 -- 80 21 123/72 96 %   09/21/22 0928 97.3 °F (36.3 °C) 82 16 123/68 99 %       EKG interpretation: (Preliminary)  V paced, rate 80, QRS: Prolonged, no significant ST elevations or depressions    Records Reviewed: Nursing Notes, Previous Radiology Studies, and Previous Laboratory Studies    Provider Notes (Medical Decision Making):   3year-old female, past medical history significant for CHF, pacemaker presenting to the emergency department with chief complaint of multiple falls. On exam, she is hemodynamically stable, vital signs stable, neurologic exam is intact, she has bruising to her bilateral elbows with a small abrasion over her left elbow. Nonischemic. Differentials include electrolyte abnormality versus UTI versus stroke versus seizure versus arrhythmia. Will obtain basic labs, CK as patient was laying on the floor for significant amount of time, head CT, bilateral elbow x-rays. Urinalysis, C-spine cleared Via Nexus criteria. Anticipate admission given patient's multiple falls and the fact that she lives alone. ED Course:   Initial assessment performed. The patients presenting problems have been discussed, and they are in agreement with the care plan formulated and outlined with them. I have encouraged them to ask questions as they arise throughout their visit. Disposition:  Admitted to Floor Medical Floor the case was discussed with the admitting physician Dr. Juan Sharma      Diagnosis     Clinical Impression:   1. Fall, initial encounter    2. NSTEMI (non-ST elevated myocardial infarction) [facility])        Attestations:    Qing Garza, DO        Please note that this dictation was completed with [employer], the computer voice recognition software. Quite often unanticipated grammatical, syntax, homophones, and other interpretive errors are inadvertently transcribed by the computer software. Please disregard these errors. Please excuse any errors that have escaped final proofreading. Thank you.

## 2022-09-21 NOTE — PROGRESS NOTES
Consult called to Dr. Maulik Cam group. Provider not yet assigned - several providers in house. One will pick-up this patient.

## 2022-09-21 NOTE — H&P
History and Physical    Patient: Farhad Ram MRN: [id number]  SSN: [id number]    YOB: 1931  Age: 80 y.o. Sex: female      Subjective:      Farhad Ram is a 80 y.o. female who presents to the emergency department as she resides alone and has had multiple ground-level falls. She normally ambulates with a rolling walker although this has been more difficult according to the family is present at the bedside. She denies any pain with the exception of her right knee. Elbow x-rays were done bilaterally with no obvious acute injury. Right knee x-ray and chest x-ray pending. Past Medical History:   Diagnosis Date    BBB (bundle branch block)     Heart failure ([facility] [address]     followed by Dr Manuel Orozco     Past Surgical History:   Procedure Laterality Date    CARDIAC SURG PROCEDURE UNLIST      stent placed     HX APPENDECTOMY      HX  SECTION      x 3    HX HEENT      both ears    HX PACEMAKER        No family history on file. Social History     Tobacco Use    Smoking status: Never    Smokeless tobacco: Not on file   Substance Use Topics    Alcohol use: No      Prior to Admission medications    Medication Sig Start Date End Date Taking? Authorizing Provider   ondansetron (ZOFRAN ODT) 4 mg disintegrating tablet Take 1 Tab by mouth every eight (8) hours as needed for Nausea. 18   Micheline Johnson MD   furosemide (LASIX) 40 mg tablet Take 40 mg by mouth daily. Provider, Radha   rosuvastatin (CRESTOR) 5 mg tablet Take 5 mg by mouth nightly. Marly Mena MD   clopidogrel (PLAVIX) 75 mg tablet Take  by mouth daily. Marly Mena MD   lansoprazole (PREVACID) 15 mg capsule Take  by mouth Daily (before breakfast). Marly Mena MD   carvedilol (COREG) 6.25 mg tablet Take 3.125 mg by mouth two (2) times daily (with meals). Marly Mena MD   ramipril (ALTACE) 2.5 mg capsule Take  by mouth daily.       Marly Mena MD   aspirin delayed-release 81 mg tablet Take 162 mg by mouth daily. Other, MD Marly        Allergies   Allergen Reactions    Adhesive Rash    Clindamycin Rash    Hydrocodone Other (comments)     \"felt loopy\"    Pcn [Penicillins] Rash       Review of Systems:  Review of Systems   Constitutional:  Positive for malaise/fatigue. HENT: Negative. Respiratory:  Negative for cough and shortness of breath. Cardiovascular:  Positive for leg swelling. Negative for chest pain. Gastrointestinal:  Negative for abdominal pain, nausea and vomiting. Genitourinary: Negative. Musculoskeletal: Negative. Skin: Negative. Neurological: Negative. Psychiatric/Behavioral: Negative. Objective:     Recent Results (from the past 24 hour(s))   EKG, 12 LEAD, INITIAL    Collection Time: 09/21/22 10:06 AM   Result Value Ref Range    Ventricular Rate 80 BPM    Atrial Rate 93 BPM    QRS Duration 134 ms    Q-T Interval 460 ms    QTC Calculation (Bezet) 530 ms    Calculated R Axis -79 degrees    Calculated T Axis 89 degrees    Diagnosis       Ventricular-paced rhythm  Biventricular pacemaker detected  When compared with ECG of 15-DEC-2018 12:53,  No significant change was found  Confirmed by Preet Freeman ([id number]) on 9/21/2022 12:33:37 PM     CBC WITH AUTOMATED DIFF    Collection Time: 09/21/22 10:17 AM   Result Value Ref Range    WBC 9.7 3.6 - 11.0 K/uL    RBC 4.62 3.80 - 5.20 M/uL    HGB 14.7 11.5 - 16.0 g/dL    HCT 42.3 35.0 - 47.0 %    MCV 91.6 80.0 - 99.0 FL    MCH 31.8 26.0 - 34.0 PG    MCHC 34.8 30.0 - 36.5 g/dL    RDW 13.4 11.5 - 14.5 %    PLATELET 744 086 - 399 K/uL    MPV 10.6 8.9 - 12.9 FL    NRBC 0.0 0  WBC    ABSOLUTE NRBC 0.00 0.00 - 0.01 K/uL    NEUTROPHILS 72 32 - 75 %    LYMPHOCYTES 17 12 - 49 %    MONOCYTES 11 5 - 13 %    EOSINOPHILS 0 0 - 7 %    BASOPHILS 0 0 - 1 %    IMMATURE GRANULOCYTES 0 0.0 - 0.5 %    ABS. NEUTROPHILS 6.9 1.8 - 8.0 K/UL    ABS. LYMPHOCYTES 1.6 0.8 - 3.5 K/UL    ABS. MONOCYTES 1.1 (H) 0.0 - 1.0 K/UL    ABS. EOSINOPHILS 0.0 0.0 - 0.4 K/UL    ABS. BASOPHILS 0.0 0.0 - 0.1 K/UL    ABS. IMM. GRANS. 0.0 0.00 - 0.04 K/UL    DF AUTOMATED     METABOLIC PANEL, COMPREHENSIVE    Collection Time: 09/21/22 10:17 AM   Result Value Ref Range    Sodium 138 136 - 145 mmol/L    Potassium 3.3 (L) 3.5 - 5.1 mmol/L    Chloride 97 97 - 108 mmol/L    CO2 35 (H) 21 - 32 mmol/L    Anion gap 6 5 - 15 mmol/L    Glucose 124 (H) 65 - 100 mg/dL    BUN 77 (H) 6 - 20 MG/DL    Creatinine 1.44 (H) 0.55 - 1.02 MG/DL    BUN/Creatinine ratio 53 (H) 12 - 20      GFR est AA 41 (L) >60 ml/min/1.73m2    GFR est non-AA 34 (L) >60 ml/min/1.73m2    Calcium 9.8 8.5 - 10.1 MG/DL    Bilirubin, total 0.6 0.2 - 1.0 MG/DL    ALT (SGPT) 22 12 - 78 U/L    AST (SGOT) 34 15 - 37 U/L    Alk. phosphatase 71 45 - 117 U/L    Protein, total 7.2 6.4 - 8.2 g/dL    Albumin 3.2 (L) 3.5 - 5.0 g/dL    Globulin 4.0 2.0 - 4.0 g/dL    A-G Ratio 0.8 (L) 1.1 - 2.2     TROPONIN-HIGH SENSITIVITY    Collection Time: 09/21/22 10:17 AM   Result Value Ref Range    Troponin-High Sensitivity 137 (HH) 0 - 51 ng/L   MAGNESIUM    Collection Time: 09/21/22 10:17 AM   Result Value Ref Range    Magnesium 2.2 1.6 - 2.4 mg/dL   CK    Collection Time: 09/21/22 10:17 AM   Result Value Ref Range     26 - 192 U/L        CT HEAD WO CONT   Final Result   No acute changes. XR ELBOW LT MIN 3 V   Final Result   No acute abnormality. XR ELBOW RT MIN 3 V   Final Result   No acute abnormality. Vitals:    09/21/22 0928 09/21/22 1300 09/21/22 1314   BP: 123/68 123/72 92/61   Pulse: 82 80 80   Resp: 16 21 15   Temp: 97.3 °F (36.3 °C)     SpO2: 99% 96% 91%   Weight: 57.6 kg (127 lb)     Height: 4' 9\" (1.448 m)          Physical Exam:  Physical Exam  Vitals reviewed. HENT:      Head: Normocephalic and atraumatic. Eyes:      Conjunctiva/sclera: Conjunctivae normal.   Cardiovascular:      Rate and Rhythm: Normal rate and regular rhythm. Heart sounds: Normal heart sounds.    Pulmonary: Effort: Pulmonary effort is normal.      Breath sounds: Normal breath sounds. Abdominal:      General: Abdomen is flat. Bowel sounds are normal.   Musculoskeletal:         General: Normal range of motion. Comments: Right knee pain and difficulty with knee flexion   Neurological:      General: No focal deficit present. Mental Status: She is alert and oriented to person, place, and time. Psychiatric:         Mood and Affect: Mood normal.        Assessment:     Hospital Problems  Never Reviewed            Codes Class Noted POA    Falls ICD-10-CM: W19. Sohail Bragg  ICD-9-CM: S163.3  9/21/2022 Unknown           Plan:     Impression\plan:    1. Generalized weakness with falls  Patient normally ambulates with a rolling walker and lives alone  Multiple falls reported from family  Right knee pain x-ray pending  Bilateral elbow x-rays negative for acute fracture  CT of the head with no acute abnormality  Patient is borderline hypotensive with systolic of 92  Urinalysis pending  Troponin pending    2. Hypokalemia  Potassium 3.3  Replete    3. CHRISTIAN  Baseline creatinine approximately 1  Current creatinine 1.44  Replenish with volume    4. Failure to thrive at home alone  Case management  PT OT    5. Troponin elevation  May be contributed to dehydration  Repeat troponin  Echocardiogram  EKG ventricularly paced  Consider cardiology consult    6.   Hypertension  Hold antihypertensives although we do not know current medications  Midodrine for systolic blood pressure less than 90  IV fluids    CODE STATUS: Full Code    DVT prophylaxis: SCDs  Ulcer prophylaxis: None    Patient is a poor historian and does not have her medications available, will need to be updated    Son is traveling in from [facility] and at this point patient remains CODE STATUS full code    Time evaluation 55 minutes    Signed By: Ignacia Chandra PA-C     September 21, 2022

## 2022-09-21 NOTE — Clinical Note
Patient Class[de-identified] OBSERVATION [104]   Type of Bed: Remote Telemetry [29]   Cardiac Monitoring Required?: Yes   Reason for Observation: Falls   Admitting Diagnosis: Falls [[id number]]   Admitting Physician: Renata Beasley [[id number]]   Attending Physician: Renata Beasley [[id number]]

## 2022-09-21 NOTE — PROGRESS NOTES
Report given to Zunilda Ek documented and patient on cardiac monitor. V paced. BP typically runs low per patient and son. Pt assisted to bathroom - had large, formed bowel movement. Pt \"cruises\" - reaches for furniture while ambulating - would benefit from PT/OT eval.  Orders in place. Denied any dizziness with ambulation.

## 2022-09-22 PROBLEM — R55 SYNCOPE AND COLLAPSE: Status: ACTIVE | Noted: 2022-09-22

## 2022-09-22 PROBLEM — R55 SYNCOPE AND COLLAPSE: Status: ACTIVE | Noted: 2022-01-01

## 2022-09-22 LAB
ALBUMIN SERPL-MCNC: 2.5 G/DL (ref 3.5–5)
ALBUMIN/GLOB SERPL: 0.8 {RATIO} (ref 1.1–2.2)
ALP SERPL-CCNC: 55 U/L (ref 45–117)
ALT SERPL-CCNC: 20 U/L (ref 12–78)
ANION GAP SERPL CALC-SCNC: 6 MMOL/L (ref 5–15)
AST SERPL-CCNC: 29 U/L (ref 15–37)
BASOPHILS # BLD: 0 K/UL (ref 0–0.1)
BASOPHILS NFR BLD: 1 % (ref 0–1)
BILIRUB SERPL-MCNC: 0.5 MG/DL (ref 0.2–1)
BUN SERPL-MCNC: 61 MG/DL (ref 6–20)
BUN/CREAT SERPL: 50 (ref 12–20)
CALCIUM SERPL-MCNC: 9 MG/DL (ref 8.5–10.1)
CHLORIDE SERPL-SCNC: 101 MMOL/L (ref 97–108)
CO2 SERPL-SCNC: 29 MMOL/L (ref 21–32)
CREAT SERPL-MCNC: 1.21 MG/DL (ref 0.55–1.02)
DIFFERENTIAL METHOD BLD: ABNORMAL
EOSINOPHIL # BLD: 0.1 K/UL (ref 0–0.4)
EOSINOPHIL NFR BLD: 2 % (ref 0–7)
ERYTHROCYTE [DISTWIDTH] IN BLOOD BY AUTOMATED COUNT: 13.6 % (ref 11.5–14.5)
GLOBULIN SER CALC-MCNC: 3.3 G/DL (ref 2–4)
GLUCOSE SERPL-MCNC: 100 MG/DL (ref 65–100)
HCT VFR BLD AUTO: 36.7 % (ref 35–47)
HGB BLD-MCNC: 12.5 G/DL (ref 11.5–16)
IMM GRANULOCYTES # BLD AUTO: 0 K/UL (ref 0–0.04)
IMM GRANULOCYTES NFR BLD AUTO: 0 % (ref 0–0.5)
LYMPHOCYTES # BLD: 1.9 K/UL (ref 0.8–3.5)
LYMPHOCYTES NFR BLD: 31 % (ref 12–49)
MCH RBC QN AUTO: 31.6 PG (ref 26–34)
MCHC RBC AUTO-ENTMCNC: 34.1 G/DL (ref 30–36.5)
MCV RBC AUTO: 92.9 FL (ref 80–99)
MONOCYTES # BLD: 0.9 K/UL (ref 0–1)
MONOCYTES NFR BLD: 14 % (ref 5–13)
NEUTS SEG # BLD: 3.3 K/UL (ref 1.8–8)
NEUTS SEG NFR BLD: 53 % (ref 32–75)
NRBC # BLD: 0 K/UL (ref 0–0.01)
NRBC BLD-RTO: 0 PER 100 WBC
PLATELET # BLD AUTO: 140 K/UL (ref 150–400)
PMV BLD AUTO: 10.6 FL (ref 8.9–12.9)
POTASSIUM SERPL-SCNC: 3.8 MMOL/L (ref 3.5–5.1)
PROT SERPL-MCNC: 5.8 G/DL (ref 6.4–8.2)
RBC # BLD AUTO: 3.95 M/UL (ref 3.8–5.2)
SODIUM SERPL-SCNC: 136 MMOL/L (ref 136–145)
TROPONIN-HIGH SENSITIVITY: 109 NG/L (ref 0–51)
WBC # BLD AUTO: 6.3 K/UL (ref 3.6–11)

## 2022-09-22 PROCEDURE — 97166 OT EVAL MOD COMPLEX 45 MIN: CPT

## 2022-09-22 PROCEDURE — 84484 ASSAY OF TROPONIN QUANT: CPT

## 2022-09-22 PROCEDURE — 80053 COMPREHEN METABOLIC PANEL: CPT

## 2022-09-22 PROCEDURE — 36415 COLL VENOUS BLD VENIPUNCTURE: CPT

## 2022-09-22 PROCEDURE — 74011250637 HC RX REV CODE- 250/637: Performed by: HOSPITALIST

## 2022-09-22 PROCEDURE — 65270000046 HC RM TELEMETRY

## 2022-09-22 PROCEDURE — 74011250636 HC RX REV CODE- 250/636: Performed by: HOSPITALIST

## 2022-09-22 PROCEDURE — 97530 THERAPEUTIC ACTIVITIES: CPT

## 2022-09-22 PROCEDURE — 97530 THERAPEUTIC ACTIVITIES: CPT | Performed by: PHYSICAL THERAPIST

## 2022-09-22 PROCEDURE — 97161 PT EVAL LOW COMPLEX 20 MIN: CPT | Performed by: PHYSICAL THERAPIST

## 2022-09-22 PROCEDURE — G0378 HOSPITAL OBSERVATION PER HR: HCPCS

## 2022-09-22 PROCEDURE — 85025 COMPLETE CBC W/AUTO DIFF WBC: CPT

## 2022-09-22 PROCEDURE — 74011000250 HC RX REV CODE- 250: Performed by: PHYSICIAN ASSISTANT

## 2022-09-22 PROCEDURE — 97535 SELF CARE MNGMENT TRAINING: CPT

## 2022-09-22 PROCEDURE — 97116 GAIT TRAINING THERAPY: CPT | Performed by: PHYSICAL THERAPIST

## 2022-09-22 RX ORDER — ASPIRIN 81 MG/1
162 TABLET ORAL DAILY
Status: DISCONTINUED | OUTPATIENT
Start: 2022-09-22 | End: 2022-09-22

## 2022-09-22 RX ORDER — METRONIDAZOLE 250 MG/1
250 TABLET ORAL 2 TIMES DAILY
COMMUNITY

## 2022-09-22 RX ORDER — METRONIDAZOLE 250 MG/1
250 TABLET ORAL 2 TIMES DAILY
Status: DISCONTINUED | OUTPATIENT
Start: 2022-09-22 | End: 2022-09-26 | Stop reason: HOSPADM

## 2022-09-22 RX ORDER — CLOPIDOGREL BISULFATE 75 MG/1
75 TABLET ORAL DAILY
Status: DISCONTINUED | OUTPATIENT
Start: 2022-09-22 | End: 2022-09-22

## 2022-09-22 RX ORDER — ROSUVASTATIN CALCIUM 10 MG/1
5 TABLET, COATED ORAL
Status: DISCONTINUED | OUTPATIENT
Start: 2022-09-22 | End: 2022-09-26 | Stop reason: HOSPADM

## 2022-09-22 RX ORDER — PANTOPRAZOLE SODIUM 40 MG/1
40 TABLET, DELAYED RELEASE ORAL
Status: DISCONTINUED | OUTPATIENT
Start: 2022-09-23 | End: 2022-09-26 | Stop reason: HOSPADM

## 2022-09-22 RX ORDER — SODIUM CHLORIDE 9 MG/ML
50 INJECTION, SOLUTION INTRAVENOUS CONTINUOUS
Status: DISPENSED | OUTPATIENT
Start: 2022-09-22 | End: 2022-09-23

## 2022-09-22 RX ORDER — ASPIRIN 81 MG/1
81 TABLET ORAL DAILY
Status: DISCONTINUED | OUTPATIENT
Start: 2022-09-22 | End: 2022-09-22

## 2022-09-22 RX ORDER — CARVEDILOL 3.12 MG/1
3.12 TABLET ORAL 2 TIMES DAILY WITH MEALS
COMMUNITY
End: 2022-09-26

## 2022-09-22 RX ADMIN — ROSUVASTATIN CALCIUM 5 MG: 10 TABLET, FILM COATED ORAL at 22:22

## 2022-09-22 RX ADMIN — SODIUM CHLORIDE, PRESERVATIVE FREE 10 ML: 5 INJECTION INTRAVENOUS at 16:38

## 2022-09-22 RX ADMIN — SODIUM CHLORIDE 50 ML/HR: 9 INJECTION, SOLUTION INTRAVENOUS at 16:32

## 2022-09-22 RX ADMIN — SODIUM CHLORIDE, PRESERVATIVE FREE 10 ML: 5 INJECTION INTRAVENOUS at 22:22

## 2022-09-22 RX ADMIN — METRONIDAZOLE 250 MG: 250 TABLET ORAL at 16:35

## 2022-09-22 NOTE — PROGRESS NOTES
Problem: Mobility Impaired (Adult and Pediatric)  Goal: *Acute Goals and Plan of Care (Insert Text)  Description: FUNCTIONAL STATUS PRIOR TO ADMISSION: Patient was modified independent using a rollator for functional mobility. HOME SUPPORT PRIOR TO ADMISSION: The patient lived alone with son to provide assistance. Physical Therapy Goals  Initiated 9/22/2022  1. Patient will move from supine to sit and sit to supine  in bed with independence within 7 day(s). 2.  Patient will transfer from bed to chair and chair to bed with modified independence using the least restrictive device within 7 day(s). 3.  Patient will perform sit to stand with modified independence within 7 day(s). 4.  Patient will ambulate with modified independence for 150 feet with the least restrictive device within 7 day(s). Outcome: Not Met   PHYSICAL THERAPY EVALUATION  Patient: Micaela Stewart (89 y.o. female)  Date: 9/22/2022  Primary Diagnosis: Falls [W19. XXXA]  Syncope and collapse [R55]       Precautions:   Fall    ASSESSMENT  Based on the objective data described below, the patient presents with asymptomatic orthostatic hypotension, pain in both hip flexors > knees, general decrease in strength/activity tolerance, decreased standing balance/tolerance, and decreased functional mobility, including bed mobility, transfers, and gait. She is cooperative and willing to participate. She comes to sit with verbal cues, increased time, and supervision with head of bed raised ~30-degrees. She is supervision with static sitting at edge of bed. Patient comes to stand with contact guard assist x 2, and she tolerates static standing >/= 1 minute with 1-2 UE support and contact guard assist. Systolic blood pressure changes from 140 supine to 120 sitting and 106 while standing, but patient does not complain of dizziness (see Flowsheets for exact measurements during Evaluation).  She is able to ambulate a total of 30 feet (15 feet x 2, seated rest between trials) with hand-held/minimal assist x 2. Patient left with bed in chair position, call bell in reach, and lunch tray available, nursing aware. Patient will likely be able to return home with initial 24/7 supervision/assist for safety and HHPT services. Current Level of Function Impacting Discharge (mobility/balance): supervision supine to sit, minimal assist sit to supine, contact guard assist x 2 sit to/from stand, gait 15 feet x 2 with hand-held/minimal assist x 2. Functional Outcome Measure: The patient scored 55/100 on the Barthel outcome measure which is indicative of moderate dependence with ADL/functional mobility. Other factors to consider for discharge: lives alone, son is only 2 miles away (per patient report), patient does not drive     Patient will benefit from skilled therapy intervention to address the above noted impairments. PLAN :  Recommendations and Planned Interventions: bed mobility training, transfer training, gait training, therapeutic exercises, neuromuscular re-education, patient and family training/education, and therapeutic activities      Frequency/Duration: Patient will be followed by physical therapy:  4 times a week to address goals. Recommendation for discharge: (in order for the patient to meet his/her long term goals)  Physical therapy at least 2 days/week in the home AND ensure assist and/or supervision for safety with mobility as needed    This discharge recommendation:  Has not yet been discussed the attending provider and/or case management    IF patient discharges home will need the following DME: patient owns DME required for discharge         SUBJECTIVE:   Patient stated I use my rollator in the house all the time.     OBJECTIVE DATA SUMMARY:   HISTORY:    Past Medical History:   Diagnosis Date    BBB (bundle branch block)     Heart failure ([facility] [address]     followed by Dr Marlys Fraser     Past Surgical History:   Procedure Laterality Date CARDIAC SURG PROCEDURE UNLIST  2009    stent placed     HX APPENDECTOMY      HX  SECTION      x 3    HX HEENT      both ears    HX PACEMAKER         Personal factors and/or comorbidities impacting plan of care: none noted    Home Situation  Home Environment: Private residence  Wheelchair Ramp: Yes  One/Two Story Residence: One story  Living Alone: Yes  Support Systems: Child(johnny), Other Family Member(s)  Patient Expects to be Discharged to[de-identified] Home  Current DME Used/Available at Home: [employer], rollator, Grab bars, [place], straight, Adaptive dressing aides  Tub or Shower Type: Tub/Shower combination (bathes at sink)    EXAMINATION/PRESENTATION/DECISION MAKING:   Critical Behavior:  Neurologic State: Alert  Orientation Level: Appropriate for age, Oriented X4  Cognition: Follows commands  Safety/Judgement: Awareness of environment, Fall prevention, Insight into deficits  Hearing:   Hearing aides noted bilaterally  Skin:  IV site left UE, + telemetry, red knees bilaterally  Edema: distal bilateral LEs/dorsal feet  Range Of Motion:  AROM: Generally decreased, functional                       Strength:    Strength: Generally decreased, functional                    Tone & Sensation:   Tone: Normal              Sensation: Intact               Coordination:  Coordination: Generally decreased, functional  Vision:    + glasses, patient states that she can only see out of one eye  Functional Mobility:  Bed Mobility:     Supine to Sit: Additional time;Bed Modified;Supervision; Other (comment) (verbal cues)  Sit to Supine: Minimum assistance;Assist x1 (verbal cues for technique)  Scooting: Maximum assistance;Assist x2  Transfers:  Sit to Stand: Contact guard assistance;Assist x2  Stand to Sit: Contact guard assistance;Assist x1        Bed to Chair: Assist x2;Minimum assistance (hand-held assist)              Balance:   Sitting: Intact  Standing: Impaired; Without support  Standing - Static: Constant support;Good  Standing - Dynamic : Constant support; Fair  Ambulation/Gait Training:  Distance (ft): 30 Feet (ft) (15 feet x 2 with seated rest between trials)  Assistive Device: Other (comment) (hand-held assist)  Ambulation - Level of Assistance: Minimal assistance;Assist x2 (hand-held assist)     Gait Description (WDL): Exceptions to WDL  Gait Abnormalities: Decreased step clearance  Right Side Weight Bearing: Full  Left Side Weight Bearing: Full  Base of Support: Widened     Speed/Janina: Pace decreased (<100 feet/min)  Step Length: Left shortened;Right shortened         Therapeutic Exercises:   AROM in available ranges both UE/LEs prior to mobility training    Functional Measure:  Barthel Index:    Bathin  Bladder: 10  Bowels: 10  Groomin  Dressin  Feeding: 10  Mobility: 0  Stairs: 0  Toilet Use: 5  Transfer (Bed to Chair and Back): 10  Total: 55/100       The Barthel ADL Index: Guidelines  1. The index should be used as a record of what a patient does, not as a record of what a patient could do. 2. The main aim is to establish degree of independence from any help, physical or verbal, however minor and for whatever reason. 3. The need for supervision renders the patient not independent. 4. A patient's performance should be established using the best available evidence. Asking the patient, friends/relatives and nurses are the usual sources, but direct observation and common sense are also important. However direct testing is not needed. 5. Usually the patient's performance over the preceding 24-48 hours is important, but occasionally longer periods will be relevant. 6. Middle categories imply that the patient supplies over 50 per cent of the effort. 7. Use of aids to be independent is allowed. Score Interpretation (from [address])    Independent   60-79 Minimally independent   40-59 Partially dependent   20-39 Very dependent   <20 Totally dependent     -Darío Silva., Barthel, D.W. (1965).  Functional evaluation: the Barthel Index. [facility] ([address] (1997). The Barthel activities of daily living index: self-reporting versus actual performance in the old (> or = 75 years). Journal of [address] [place] [address] 45(7), [facility], MIKEY, Holger Castaneda., [place] Ray. (1999). Measuring the change in disability after inpatient rehabilitation; comparison of the responsiveness of the Barthel Index and Functional [place] Measure. Journal of Neurology, Neurosurgery, and Psychiatry, 66(4), 290-850. SHEILA Marrero, COURT Basurto, & Mikie Douglas M.A. (2004) Assessment of post-stroke quality of life in cost-effectiveness studies: The usefulness of the Barthel Index and the EuroQoL-5D. Quality of Life Research, 15, 351-58           Physical Therapy Evaluation Charge Determination   History Examination Presentation Decision-Making   LOW Complexity : Zero comorbidities / personal factors that will impact the outcome / POC LOW Complexity : 1-2 Standardized tests and measures addressing body structure, function, activity limitation and / or participation in recreation  LOW Complexity : Stable, uncomplicated  LOW Complexity : FOTO score of       Based on the above components, the patient evaluation is determined to be of the following complexity level: LOW     Pain Ratin/10 both hip flexors/groin area, nursing aware    Activity Tolerance:   Fair, SpO2 stable on RA, and requires rest breaks    After treatment patient left in no apparent distress:   Bed in chair-position, Call bell within reach, Bed / chair alarm activated, and Side rails x 3    COMMUNICATION/EDUCATION:   The patients plan of care was discussed with: Occupational therapist and Registered nurse. Fall prevention education was provided and the patient/caregiver indicated understanding., Patient/family have participated as able in goal setting and plan of care. , and Patient/family agree to work toward stated goals and plan of care.     Thank you for this referral.  Fer Levy, PT   Time Calculation: 34 mins

## 2022-09-22 NOTE — CONSULTS
EP/ ARRHYTHMIA/ CARDIOLOGY CONSULT requested by Dr. Shipman Form secondary to falls, detectable troponin    Patient ID:  Patient: Sylvester Devi  MRN: [id number]  Age: 80 y.o.  : 1/15/1931    Date of  Admission: 2022  9:13 AM   PCP:  Claude Drier, MD  Usual cardiologist:  Herbie Kellogg MD    Assessment:   Recurrent falls leading to admission. She has fallen in the past (non-syncopal). Transient hypotension noted here. Possible dehydration and orthostatic hypotension, though does not endorse lightheadedness or dizziness. 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 Ischemic heart disease with remote LAD stent placement in 2007. No angina. Nuclear stress test without ischemia in . Nonischemic cardiomyopathy with ischemic heart diease as noted above, EF 35-40% in . Chronic systolic CHF. Chronic anticoagulation with Xarelto. Full code. Plan: Will check her biventricular pacemaker this admission to make sure normal function is present. She has ~3 months of battery longevity per the 9/15/2022 office note with Alessandro Hicks NP. Chronic anticoagulation may be problematic. Will see what the Afib/flutter burden is on her pacemaker check to help weigh risk:benefit. Agree with echo. Tele analysis while here. Agree with holding anything that would lower BP (carvedilol, ramipril, furosemide). Not worried about the troponin, no evidence of ACS or acute myocardial injury. Will need PT/OT perhaps. I'll check back on the pacemaker check later today. [x]       High complexity decision making was performed in this patient    Sylvester Devi is a 80 y.o. female with a history of the above here for consultation for evaluation and treatment. She does not currently report syncope, dizziness, palpitations, orthopnea, PND edema, TIA or stroke symptoms. No chest pain.     She has issues getting up out of bed in the AM.  This is when she is most unsteady on her feet, but not dizzy, lightheaded. Currently stable. She is getting gentle hydration. Past Medical History:   Diagnosis Date    BBB (bundle branch block)     Heart failure ([address]     followed by Dr Shannan Mccoy        Past Surgical History:   Procedure Laterality Date    CARDIAC SURG PROCEDURE UNLIST  2009    stent placed     HX APPENDECTOMY      HX  SECTION      x 3    HX HEENT      both ears    HX PACEMAKER         Social History     Tobacco Use    Smoking status: Never    Smokeless tobacco: Not on file   Substance Use Topics    Alcohol use: No        No family history on file. Allergies   Allergen Reactions    Adhesive Rash    Clindamycin Rash    Hydrocodone Other (comments)     \"felt loopy\"    Pcn [Penicillins] Rash          Current Facility-Administered Medications   Medication Dose Route Frequency    0.9% sodium chloride infusion  75 mL/hr IntraVENous CONTINUOUS    sodium chloride (NS) flush 5-40 mL  5-40 mL IntraVENous Q8H    sodium chloride (NS) flush 5-40 mL  5-40 mL IntraVENous PRN    acetaminophen (TYLENOL) tablet 650 mg  650 mg Oral Q6H PRN    Or    acetaminophen (TYLENOL) suppository 650 mg  650 mg Rectal Q6H PRN    polyethylene glycol (MIRALAX) packet 17 g  17 g Oral DAILY PRN    ondansetron (ZOFRAN ODT) tablet 4 mg  4 mg Oral Q8H PRN    Or    ondansetron (ZOFRAN) injection 4 mg  4 mg IntraVENous Q6H PRN    midodrine (PROAMATINE) tablet 5 mg  5 mg Oral TID PRN     Current Outpatient Medications   Medication Sig    ondansetron (ZOFRAN ODT) 4 mg disintegrating tablet Take 1 Tab by mouth every eight (8) hours as needed for Nausea. furosemide (LASIX) 40 mg tablet Take 40 mg by mouth daily. rosuvastatin (CRESTOR) 5 mg tablet Take 5 mg by mouth nightly. clopidogrel (PLAVIX) 75 mg tablet Take  by mouth daily. lansoprazole (PREVACID) 15 mg capsule Take  by mouth Daily (before breakfast).       carvedilol (COREG) 6.25 mg tablet Take 3.125 mg by mouth two (2) times daily (with meals). ramipril (ALTACE) 2.5 mg capsule Take  by mouth daily. aspirin delayed-release 81 mg tablet Take 162 mg by mouth daily. Review of Symptoms:  See HPI as well.   General: negative for fever, chills, sweats, +weight loss   Eyes: negative for blurred vision, eye pain, loss of vision, diplopia   Ear Nose and Throat: negative for rhinorrhea, pharyngitis, otalgia, tinnitus, speech or swallowing difficulties   Respiratory: negative for SOB, cough, sputum production, wheezing, GAITAN, pleuritic pain   Cardiology: negative for chest pain, palpitations, orthopnea, PND, edema, syncope   Gastrointestinal: negative for abdominal pain, N/V, dysphagia, change in bowel habits, bleeding   Genitourinary: negative for frequency, urgency, dysuria, hematuria, incontinence   Muskuloskeletal : negative for arthralgia, myalgia   Hematology: negative for easy bruising, bleeding, lymphadenopathy   Dermatological: negative for rash, ulceration, mole change, new lesion   Endocrine: negative for hot flashes or polydipsia   Neurological: negative for headache, dizziness, confusion, focal weakness, paresthesia, memory loss, +gait disturbance   Psychological: negative for anxiety, depression, agitation       Objective:      Physical Exam:  Temp (24hrs), Av °F (36.7 °C), Min:97.3 °F (36.3 °C), Max:99 °F (37.2 °C)    Patient Vitals for the past 8 hrs:   Pulse   22 0833 81   22 0831 80   22 0731 82   22 0159 80    Patient Vitals for the past 8 hrs:   Resp   22 0833 23   22 0831 17   22 0731 21   22 0159 22    Patient Vitals for the past 8 hrs:   BP   22 0833 107/81   22 0831 (!) 71/61   22 0159 (!) 120/58        Intake/Output Summary (Last 24 hours) at 2022 0848  Last data filed at 2022 0171  Gross per 24 hour   Intake 500 ml   Output --   Net 500 ml       Nondiaphoretic, not in acute distress, small-framed elderly woman. Supple, nontender, no palpable thyromegaly. No scleral icterus, mucous membranes moist, conjuctivae pink, no xanthelasma. L chest BIV pacer site OK. Unlabored, clear to auscultation bilaterally, symmetric air movement. Regular rate and rhythm, no murmur, pericardial rub, knock, or gallop. No JVD or peripheral edema. Palpable radial pulses bilaterally. Abdomen, soft, nontender, nondistended. No abdominal bruit or pulstatile masses. Extremities without cyanosis or clubbing. Muscle tone and bulk normal for age. Skin warm and dry. No rashes or ulcers. Neuro grossly nonfocal.  No tremor. Awake and appropriate. CARDIOGRAPHICS and STUDIES, I reviewed:    Telemetry:  No events. Artifact with ventricular pacing. ECG:  Artifact. BIV pacing. Labs:  Recent Labs     09/21/22  1017        No results found for: CHOL, CHOLX, CHLST, CHOLV, HDL, HDLP, LDL, LDLC, DLDLP, TGLX, TRIGL, TRIGP, CHHD, CHHDX  No results for input(s): INR, PTP, APTT, INREXT, INREXT in the last 72 hours. Recent Labs     09/22/22  0513 09/21/22  1017    138   K 3.8 3.3*    97   CO2 29 35*   BUN 61* 77*   CREA 1.21* 1.44*    124*   CA 9.0 9.8   ALB 2.5* 3.2*   WBC 6.3 9.7   HGB 12.5 14.7   HCT 36.7 42.3   * 155     Recent Labs     09/22/22  0513 09/21/22  1017   AP 55 71   TP 5.8* 7.2   ALB 2.5* 3.2*   GLOB 3.3 4.0     No components found for: GLPOC  No results for input(s): PH, PCO2, PO2 in the last 72 hours.         Elena Slater MD  9/22/2022

## 2022-09-22 NOTE — PROGRESS NOTES
Problem: Self Care Deficits Care Plan (Adult)  Goal: *Acute Goals and Plan of Care (Insert Text)  Description: FUNCTIONAL STATUS PRIOR TO ADMISSION: Patient was modified independent using a rollator for functional mobility. She lives alone and is mod I for all ADL and I-ADL except driving. She states her son watches her do her medication but she does it herself. Granddaughter lives next door and checks on her every am.    HOME SUPPORT: The patient lived alone with granddaughter and son to provide assistance. Occupational Therapy Goals  Initiated 9/22/2022  1. Patient will perform grooming x 4 tasks standing at sink with modified independence within 7 day(s). 2.  Patient will perform sponge bathing with modified independence within 7 day(s). 3.  Patient will perform upper body dressing and lower body dressing with modified independence within 7 day(s). 4.  Patient will perform toilet transfers with modified independence within 7 day(s). 5.  Patient will perform all aspects of toileting with modified independence within 7 day(s). 6.  Patient will utilize energy conservation techniques during functional activities with min verbal cues within 7 day(s). Outcome: Not Met       OCCUPATIONAL THERAPY EVALUATION  Patient: Tanya Stanford (9 y.o. female)  Date: 9/22/2022  Primary Diagnosis: Falls [W19. XXXA]  Syncope and collapse [R55]       Precautions: check BP,  Fall    ASSESSMENT  Based on the objective data described below, the patient presents with orthostatic hypotension with >30 point drop in SBP with general weakness, fatigue, SOB, decreased dynamic sitting balance, bed mobility, standing balance/tolerance, safety, functional transfers/mobility. She is max A to mod I for ADL, min A x 2 for ambulation with HHA x 2 in room which is far from her mod I PLOF. Patient exhibits slight posterior LOB with dynamic sitting tasks. She will benefit from OT to progress ADL to mod I.  Assume she will progress while in hospital--home with [facility] OT vs rehab  pending progress. Vitals:    09/22/22 1335 09/22/22 1342 09/22/22 1352 09/22/22 1402   BP: (!) 141/82  Comment: patient talking during measurement 120/73 (!) 106/94 122/75   BP 1 Location: Right upper arm Right upper arm Right upper arm    BP Patient Position: Supine Sitting Standing Semi fowlers   Pulse: 80 85 81 91   Temp:       Resp:       Height:       Weight:       SpO2: 93%  Comment: patient talking   99%        Current Level of Function Impacting Discharge (ADLs/self-care): see below    Functional Outcome Measure: The patient scored Total: 55/100 on the Barthel Index outcome measure which is indicative of being 45% impaired in basic self-care. Other factors to consider for discharge: lives alone, orthostatic hypotension     Patient will benefit from skilled therapy intervention to address the above noted impairments. PLAN :  Recommendations and Planned Interventions: self care training, functional mobility training, therapeutic exercise, balance training, therapeutic activities, endurance activities, neuromuscular re-education, patient education, home safety training, and family training/education    Frequency/Duration: Patient will be followed by occupational therapy 4 times a week to address goals. Recommendation for discharge: (in order for the patient to meet his/her long term goals)  To be determined: home with [address] vs rehab pending progress, as she lives ALONE and needs to be mod I for ADL and I-ADL    This discharge recommendation:  Has not yet been discussed the attending provider and/or case management    IF patient discharges home will need the following DME: has DME       SUBJECTIVE:   Patient stated I am scared to get in there (tub). I'm scared to fall since everything in there is hard.     OBJECTIVE DATA SUMMARY:   HISTORY:   Past Medical History:   Diagnosis Date    BBB (bundle branch block)     Heart failure ([facility] [address]     followed by Dr Jenna Maldonado Past Surgical History:   Procedure Laterality Date    CARDIAC SURG PROCEDURE UNLIST  2009    stent placed     HX APPENDECTOMY      HX  SECTION      x 3    HX HEENT      both ears    HX PACEMAKER         Expanded or extensive additional review of patient history:     Home Situation  Home Environment: Private residence  Wheelchair Ramp: Yes  One/Two Story Residence: One story  Living Alone: Yes  Support Systems: Child(johnny), Other Family Member(s)  Patient Expects to be Discharged to[de-identified] Home  Current DME Used/Available at Home: Karin Leeroy, rollator, Grab bars, [place], straight, Adaptive dressing aides  Tub or Shower Type: Tub/Shower combination (bathes at sink)    Hand dominance: Right    EXAMINATION OF PERFORMANCE DEFICITS:  Cognitive/Behavioral Status:  Neurologic State: Alert  Orientation Level: Appropriate for age;Oriented X4  Cognition: Follows commands  Perception: Appears intact  Perseveration: No perseveration noted  Safety/Judgement: Awareness of environment; Fall prevention; Insight into deficits        Hearing:       Vision/Perceptual:            One eye blind                         Range of Motion:  BUE shoulder AROM 70 degrees grossly, PROM about  B shoulders, elbow and distal WFL grossly  AROM: Generally decreased, functional                         Strength:  BUE shoulders 3-/5, elbow and distal 4-/5  Strength: Generally decreased, functional                Coordination:  Coordination: Generally decreased, functional  Fine Motor Skills-Upper: Left Intact; Right Intact (with some very [address] depth perception deficits due to one eye blind)    Gross Motor Skills-Upper: Left Impaired;Right Impaired (above 80 degrees shoulder elevation due to ROM limitations)  Reports depth perception deficits due to one eye blind    Tone & Sensation:    Tone: Normal  Sensation: Intact                      Balance:  Sitting: Impaired; Without support  Sitting - Static: Good (unsupported)  Sitting - Dynamic: Fair (occasional) (posterior LOB \"I always have to hold something\")  Standing: Impaired; Without support  Standing - Static: Constant support;Good  Standing - Dynamic : Constant support; Fair    Functional Mobility and Transfers for ADLs:  Bed Mobility:  Supine to Sit: Stand-by assistance; Additional time;Bed Modified  Sit to Supine: Moderate assistance;Assist x1;Additional time; Adaptive equipment (A B LE into bed)  Scooting: Maximum assistance;Assist x2    Transfers:  Sit to Stand: Contact guard assistance;Assist x1;Additional time  Stand to Sit: Contact guard assistance;Assist x1;Additional time  Bed to Chair: Assist x2;Minimum assistance (hand-held assist)  Bathroom Mobility: Minimum assistance (HHA x 2 amb to/from toilet/sink/back to bed)  Toilet Transfer : Assist x1;Additional time; Adaptive equipment;Minimum assistance;Contact guard assistance    ADL Assessment:  Feeding: Modified independent    Oral Facial Hygiene/Grooming: Setup;Modified Independent    Bathing: Minimum assistance         Upper Body Dressing: Minimum assistance    Lower Body Dressing: Minimum assistance    Toileting: Minimum assistance                ADL Intervention and task modifications:       Grooming  Position Performed: Standing  Washing Face: Set-up (in chair position in bed)  Washing Hands: Contact guard assistance                        Lower Body Dressing Assistance  Socks: Stand-by assistance;Contact guard assistance (posterior LOB with foot stool, did not fully remove socks from toes)  Leg Crossed Method Used: Yes  Position Performed: Seated edge of bed  Cues: Verbal cues provided;Don;Doff    Toileting  Toileting Assistance: Minimum assistance  Bladder Hygiene: Supervision  Clothing Management: Minimum assistance  Cues: Verbal cues provided  Adaptive Equipment: Grab bars (HHA)    Cognitive Retraining  Safety/Judgement: Awareness of environment; Fall prevention; Insight into deficits    Therapeutic Exercise:     Functional Measure:    Barthel Index:  Bathin  Bladder: 10  Bowels: 10  Groomin  Dressin  Feeding: 10  Mobility: 0  Stairs: 0  Toilet Use: 5  Transfer (Bed to Chair and Back): 10  Total: 55/100      The Barthel ADL Index: Guidelines  1. The index should be used as a record of what a patient does, not as a record of what a patient could do. 2. The main aim is to establish degree of independence from any help, physical or verbal, however minor and for whatever reason. 3. The need for supervision renders the patient not independent. 4. A patient's performance should be established using the best available evidence. Asking the patient, friends/relatives and nurses are the usual sources, but direct observation and common sense are also important. However direct testing is not needed. 5. Usually the patient's performance over the preceding 24-48 hours is important, but occasionally longer periods will be relevant. 6. Middle categories imply that the patient supplies over 50 per cent of the effort. 7. Use of aids to be independent is allowed. Score Interpretation (from [address] [address])    Independent   60-79 Minimally independent   40-59 Partially dependent   20-39 Very dependent   <20 Totally dependent     -Darío Silva., Barthel, D.W. (1965). Functional evaluation: the Barthel Index. [facility] ([address] (1997). The Barthel activities of daily living index: self-reporting versus actual performance in the old (> or = 75 years). Journal of [address] [place] [address] 45(7), [facility], J.J.M.F, Vahe Wong., Alaina Chaudhary. (1999). Measuring the change in disability after inpatient rehabilitation; comparison of the responsiveness of the Barthel Index and Functional Point Measure. Journal of Neurology, Neurosurgery, and Psychiatry, 66(4), 834-468.   -Mari Vazquez, N.J.A, CHINYERE Basurto.DIRK, & Craig Soto M.A. (2004) Assessment of post-stroke quality of life in cost-effectiveness studies: The usefulness of the Barthel Index and the EuroQoL-5D. Quality of Life Research, 15, 417-99     Occupational Therapy Evaluation Charge Determination   History Examination Decision-Making   MEDIUM Complexity : Expanded review of history including physical, cognitive and psychosocial  history  MEDIUM Complexity : 3-5 performance deficits relating to physical, cognitive , or psychosocial skils that result in activity limitations and / or participation restrictions MEDIUM Complexity : Patient may present with comorbidities that affect occupational performnce. Miniml to moderate modification of tasks or assistance (eg, physical or verbal ) with assesment(s) is necessary to enable patient to complete evaluation       Based on the above components, the patient evaluation is determined to be of the following complexity level: MEDIUM  Pain Rating:  No c/o in supine, 8/10 in R anterior thigh/hip area with bed mobility and when elevating R LE    Activity Tolerance:   Fair, requires rest breaks, observed SOB with activity, and signs and symptoms of orthostatic hypotension    After treatment patient left in no apparent distress:    Supine in bed and Call bell within reach    COMMUNICATION/EDUCATION:   The patients plan of care was discussed with: Physical therapist and Registered nurse. Home safety education was provided and the patient/caregiver indicated understanding., Patient/family have participated as able in goal setting and plan of care. , and Patient/family agree to work toward stated goals and plan of care. This patients plan of care is appropriate for delegation to [facility].     Thank you for this referral.  Ami Warren OTR/L   37 minutes

## 2022-09-22 NOTE — PROGRESS NOTES
Cardiology consult received, appreciate the notice by Dr. Mely Dc. Her carvedilol and ramipril are being held in the face of mild hypotension and a history of falls. I think the risk of using these low dose medications is outweighed by the benefit. Let's see how much the BP reconstitutes over the next 24-48 hours off these meds. Technically, the carvedilol will be out of the system in 3 days, the ramipril 5 days. I'll see her tomorrow. Not worried about the detectable HIGH sensitivity troponin at this time.     Signed By: Caty Ya MD     September 21, 2022

## 2022-09-22 NOTE — PROGRESS NOTES
Dr. Garcia Fess to see patient in the AM. VSS. V paced on tele. Bed alarm placed on patient when family left the bedside. NS infusing at 75mL/hr.     1930 - Bedside shift change report given to Phani Ruiz RN (oncoming nurse) by Dayton Loving RN (offgoing nurse).

## 2022-09-22 NOTE — PROGRESS NOTES
Pharmacy Medication Reconciliation     The patient was interviewed regarding current PTA medication list, use and drug allergies;  patient's son was present in room and obtained permission from patient to discuss drug regimen with visitor(s) present. The patient was questioned regarding use of any other inhalers, topical products, over the counter medications, herbal medications, vitamin products or ophthalmic/nasal/otic medication use. Allergy Update: Adhesive, Clindamycin, Hydrocodone, and Pcn [penicillins]    Recommendations/Findings: The following amendments were made to the patient's active medication list on file at [facility]:   1) Additions:   +metronidazole tablet  +xarelto  2) Deletions:   -aspirin  -clopidogrel  -ondansetron  3) Changes:  none  Pertinent Findings:  none    Clarified PTA med list with rx query, patient interview. PTA medication list was corrected to the following:     Prior to Admission Medications   Prescriptions Last Dose Informant Taking?   carvediloL (COREG) 3.125 mg tablet  Child Yes   Sig: Take 3.125 mg by mouth two (2) times daily (with meals). furosemide (LASIX) 40 mg tablet  Child Yes   Sig: Take 40 mg by mouth two (2) times a day. lansoprazole (PREVACID) 15 mg capsule  Child Yes   Sig: Take  by mouth Daily (before breakfast). metroNIDAZOLE (FLAGYL) 250 mg tablet  Child Yes   Sig: Take 250 mg by mouth two (2) times a day. ramipril (ALTACE) 2.5 mg capsule  Child Yes   Sig: Take 2.5 mg by mouth daily. rivaroxaban (XARELTO) 15 mg tab tablet  Child Yes   Sig: Take 15 mg by mouth daily (with breakfast). rosuvastatin (CRESTOR) 5 mg tablet  Child No   Sig: Take 5 mg by mouth nightly.         Facility-Administered Medications: None        Thank you,  Armin Riedel, [facility]

## 2022-09-22 NOTE — PROGRESS NOTES
Hospitalist Progress Note    NAME: Samantha Jessica   :  1/15/1931   MRN:  [id number]     Subjective:   Daily Progress Note: 2022 9:56 AM      Chief complaint: Suspected fall  Patient seen and examined, chart was reviewed. Patient still in ED, accompanied by her son. Patient blood pressure remains low, cardiac medications are being held. Son claims because of low BP she had falls in the past.  No other acute issues reported to me by patient or staff at this time  [address] updating her medication list and she is on Xarelto 15 mg daily        Current Facility-Administered Medications   Medication Dose Route Frequency    [START ON 2022] pantoprazole (PROTONIX) tablet 40 mg  40 mg Oral ACB    rosuvastatin (CRESTOR) tablet 5 mg  5 mg Oral QHS    0.9% sodium chloride infusion  50 mL/hr IntraVENous CONTINUOUS    metroNIDAZOLE (FLAGYL) tablet 250 mg  250 mg Oral BID    [START ON 2022] rivaroxaban (XARELTO) tablet 15 mg  15 mg Oral DAILY    sodium chloride (NS) flush 5-40 mL  5-40 mL IntraVENous Q8H    sodium chloride (NS) flush 5-40 mL  5-40 mL IntraVENous PRN    acetaminophen (TYLENOL) tablet 650 mg  650 mg Oral Q6H PRN    Or    acetaminophen (TYLENOL) suppository 650 mg  650 mg Rectal Q6H PRN    polyethylene glycol (MIRALAX) packet 17 g  17 g Oral DAILY PRN    ondansetron (ZOFRAN ODT) tablet 4 mg  4 mg Oral Q8H PRN    Or    ondansetron (ZOFRAN) injection 4 mg  4 mg IntraVENous Q6H PRN    midodrine (PROAMATINE) tablet 5 mg  5 mg Oral TID PRN     Current Outpatient Medications   Medication Sig    carvediloL (COREG) 3.125 mg tablet Take 3.125 mg by mouth two (2) times daily (with meals). metroNIDAZOLE (FLAGYL) 250 mg tablet Take 250 mg by mouth two (2) times a day. rivaroxaban (XARELTO) 15 mg tab tablet Take 15 mg by mouth daily (with breakfast). furosemide (LASIX) 40 mg tablet Take 40 mg by mouth two (2) times a day.     lansoprazole (PREVACID) 15 mg capsule Take  by mouth Daily (before breakfast). ramipril (ALTACE) 2.5 mg capsule Take 2.5 mg by mouth daily. rosuvastatin (CRESTOR) 5 mg tablet Take 5 mg by mouth nightly.             Objective:     Visit Vitals  /60   Pulse 80   Temp 97.8 °F (36.6 °C)   Resp 18   Ht 4' 9\" (1.448 m)   Wt 57.6 kg (127 lb)   SpO2 100%   BMI 27.48 kg/m²      O2 Device: None (Room air)    Temp (24hrs), Av.2 °F (36.8 °C), Min:97.5 °F (36.4 °C), Max:99 °F (37.2 °C)        PHYSICAL EXAM:  General chronic ill-looking thin built and nourished  Neck supple  CVS RRR  Respiratory symmetric expansion  Abdomen soft, ND  Extremities no edema  Neuro alert, normal speech  Psych poor insight  Skin no visible rash        Data Review    Recent Results (from the past 24 hour(s))   TROPONIN-HIGH SENSITIVITY    Collection Time: 22  1:08 PM   Result Value Ref Range    Troponin-High Sensitivity 112 (H) 0 - 51 ng/L   URINALYSIS W/ REFLEX CULTURE    Collection Time: 22  2:35 PM    Specimen: Urine   Result Value Ref Range    Color YELLOW/STRAW      Appearance CLEAR CLEAR      Specific gravity 1.016      pH (UA) 5.5 5.0 - 8.0      Protein Negative NEG mg/dL    Glucose Negative NEG mg/dL    Ketone Negative NEG mg/dL    Bilirubin Negative NEG      Blood Negative NEG      Urobilinogen 0.2 0.2 - 1.0 EU/dL    Nitrites Negative NEG      Leukocyte Esterase TRACE (A) NEG      UA:UC IF INDICATED CULTURE NOT INDICATED BY UA RESULT CNI      WBC 0-4 0 - 4 /hpf    RBC 0-5 0 - 5 /hpf    Epithelial cells FEW FEW /lpf    Bacteria Negative NEG /hpf    Hyaline cast 0-2 0 - 2 /lpf   TROPONIN-HIGH SENSITIVITY    Collection Time: 22  5:13 AM   Result Value Ref Range    Troponin-High Sensitivity 109 (H) 0 - 51 ng/L   CBC WITH AUTOMATED DIFF    Collection Time: 22  5:13 AM   Result Value Ref Range    WBC 6.3 3.6 - 11.0 K/uL    RBC 3.95 3.80 - 5.20 M/uL    HGB 12.5 11.5 - 16.0 g/dL    HCT 36.7 35.0 - 47.0 %    MCV 92.9 80.0 - 99.0 FL    MCH 31.6 26.0 - 34.0 PG MCHC 34.1 30.0 - 36.5 g/dL    RDW 13.6 11.5 - 14.5 %    PLATELET 295 (L) 895 - 400 K/uL    MPV 10.6 8.9 - 12.9 FL    NRBC 0.0 0  WBC    ABSOLUTE NRBC 0.00 0.00 - 0.01 K/uL    NEUTROPHILS 53 32 - 75 %    LYMPHOCYTES 31 12 - 49 %    MONOCYTES 14 (H) 5 - 13 %    EOSINOPHILS 2 0 - 7 %    BASOPHILS 1 0 - 1 %    IMMATURE GRANULOCYTES 0 0.0 - 0.5 %    ABS. NEUTROPHILS 3.3 1.8 - 8.0 K/UL    ABS. LYMPHOCYTES 1.9 0.8 - 3.5 K/UL    ABS. MONOCYTES 0.9 0.0 - 1.0 K/UL    ABS. EOSINOPHILS 0.1 0.0 - 0.4 K/UL    ABS. BASOPHILS 0.0 0.0 - 0.1 K/UL    ABS. IMM. GRANS. 0.0 0.00 - 0.04 K/UL    DF AUTOMATED     METABOLIC PANEL, COMPREHENSIVE    Collection Time: 09/22/22  5:13 AM   Result Value Ref Range    Sodium 136 136 - 145 mmol/L    Potassium 3.8 3.5 - 5.1 mmol/L    Chloride 101 97 - 108 mmol/L    CO2 29 21 - 32 mmol/L    Anion gap 6 5 - 15 mmol/L    Glucose 100 65 - 100 mg/dL    BUN 61 (H) 6 - 20 MG/DL    Creatinine 1.21 (H) 0.55 - 1.02 MG/DL    BUN/Creatinine ratio 50 (H) 12 - 20      GFR est AA 51 (L) >60 ml/min/1.73m2    GFR est non-AA 42 (L) >60 ml/min/1.73m2    Calcium 9.0 8.5 - 10.1 MG/DL    Bilirubin, total 0.5 0.2 - 1.0 MG/DL    ALT (SGPT) 20 12 - 78 U/L    AST (SGOT) 29 15 - 37 U/L    Alk. phosphatase 55 45 - 117 U/L    Protein, total 5.8 (L) 6.4 - 8.2 g/dL    Albumin 2.5 (L) 3.5 - 5.0 g/dL    Globulin 3.3 2.0 - 4.0 g/dL    A-G Ratio 0.8 (L) 1.1 - 2.2       No results found for this visit on 09/21/22. All Micro Results       Procedure Component Value Units Date/Time    CULTURE, URINE [[id number]]     Order Status: Sent Specimen: Urine from Clean catch                  Assessment/Plan:     1. Generalized weakness with falls/FTT adult  Multiple falls reported from family likely from Low BP  Right knee pain x-ray neg  Bilateral elbow x-rays negative for acute fracture  CT of the head with no acute abnormality  Urinalysis neg  PT/OT eval  Midodrine as needed for low BP    2. Hypokalemia-  Replete    3.   CHRISTIAN-gentle IV fluids, monitor creatinine    4.   CAD with stents/Chr Sys CHF/Pafib with Biv PPM and ablation  Troponin 100 range, not consistent with ACS per cardiology  Echocardiogram pend  EKG ventricularly paced  Cardiology on case fu recommendations  Unable to use GDMT meds secondary to low BP  PPM interrogation  Xarelto  15mg  daily       CODE STATUS: Full Code, considering advanced age and multiple issues and recurrent falls with admissions we will ask palliative evaluation  DVT prophylaxis: SCD  NOK Son   Dispo: Pending medical stability and progress more than 48 hours    Signed By: Zenon Espino MD     September 22, 2022      pro

## 2022-09-23 ENCOUNTER — APPOINTMENT (OUTPATIENT)
Dept: NON INVASIVE DIAGNOSTICS | Age: 87
DRG: 641 | End: 2022-09-23
Attending: PHYSICIAN ASSISTANT
Payer: MEDICARE

## 2022-09-23 LAB
ALBUMIN SERPL-MCNC: 2.6 G/DL (ref 3.5–5)
ALBUMIN/GLOB SERPL: 0.7 {RATIO} (ref 1.1–2.2)
ALP SERPL-CCNC: 69 U/L (ref 45–117)
ALT SERPL-CCNC: 21 U/L (ref 12–78)
ANION GAP SERPL CALC-SCNC: 4 MMOL/L (ref 5–15)
AST SERPL-CCNC: 32 U/L (ref 15–37)
BASOPHILS # BLD: 0 K/UL (ref 0–0.1)
BASOPHILS NFR BLD: 1 % (ref 0–1)
BILIRUB SERPL-MCNC: 0.6 MG/DL (ref 0.2–1)
BNP SERPL-MCNC: 2909 PG/ML
BUN SERPL-MCNC: 55 MG/DL (ref 6–20)
BUN/CREAT SERPL: 47 (ref 12–20)
CALCIUM SERPL-MCNC: 8.6 MG/DL (ref 8.5–10.1)
CHLORIDE SERPL-SCNC: 102 MMOL/L (ref 97–108)
CO2 SERPL-SCNC: 30 MMOL/L (ref 21–32)
CREAT SERPL-MCNC: 1.17 MG/DL (ref 0.55–1.02)
DIFFERENTIAL METHOD BLD: ABNORMAL
ECHO AO ASC DIAM: 2.5 CM
ECHO AO ASCENDING AORTA INDEX: 1.69 CM/M2
ECHO AO ROOT DIAM: 2.1 CM
ECHO AO ROOT INDEX: 1.42 CM/M2
ECHO AR MAX VEL PISA: 4 M/S
ECHO AV AREA PEAK VELOCITY: 1.4 CM2
ECHO AV AREA VTI: 1.5 CM2
ECHO AV AREA/BSA PEAK VELOCITY: 0.9 CM2/M2
ECHO AV AREA/BSA VTI: 1 CM2/M2
ECHO AV MEAN GRADIENT: 9 MMHG
ECHO AV MEAN VELOCITY: 1.4 M/S
ECHO AV PEAK GRADIENT: 17 MMHG
ECHO AV PEAK VELOCITY: 2.1 M/S
ECHO AV REGURGITANT PHT: 289.9 MILLISECOND
ECHO AV VELOCITY RATIO: 0.52
ECHO AV VTI: 41 CM
ECHO LA DIAMETER INDEX: 1.69 CM/M2
ECHO LA DIAMETER: 2.5 CM
ECHO LA TO AORTIC ROOT RATIO: 1.19
ECHO LA VOL 4C: 32 ML (ref 22–52)
ECHO LA VOLUME INDEX A4C: 22 ML/M2 (ref 16–34)
ECHO LV E' LATERAL VELOCITY: 7 CM/S
ECHO LV E' SEPTAL VELOCITY: 6 CM/S
ECHO LV EDV A4C: 65 ML
ECHO LV EDV INDEX A4C: 44 ML/M2
ECHO LV EJECTION FRACTION A4C: 46 %
ECHO LV ESV A4C: 35 ML
ECHO LV ESV INDEX A4C: 24 ML/M2
ECHO LV FRACTIONAL SHORTENING: 26 % (ref 28–44)
ECHO LV INTERNAL DIMENSION DIASTOLE INDEX: 2.84 CM/M2
ECHO LV INTERNAL DIMENSION DIASTOLIC MMODE: 4.8 CM (ref 3.9–5.3)
ECHO LV INTERNAL DIMENSION DIASTOLIC: 4.2 CM (ref 3.9–5.3)
ECHO LV INTERNAL DIMENSION SYSTOLIC INDEX: 2.09 CM/M2
ECHO LV INTERNAL DIMENSION SYSTOLIC MMODE: 3.8 CM
ECHO LV INTERNAL DIMENSION SYSTOLIC: 3.1 CM
ECHO LV IVSD: 1 CM (ref 0.6–0.9)
ECHO LV MASS 2D: 167.4 G (ref 67–162)
ECHO LV MASS INDEX 2D: 113.1 G/M2 (ref 43–95)
ECHO LV POSTERIOR WALL DIASTOLIC: 1.3 CM (ref 0.6–0.9)
ECHO LV RELATIVE WALL THICKNESS RATIO: 0.62
ECHO LVOT AREA: 2.5 CM2
ECHO LVOT AV VTI INDEX: 0.59
ECHO LVOT DIAM: 1.8 CM
ECHO LVOT MEAN GRADIENT: 3 MMHG
ECHO LVOT PEAK GRADIENT: 5 MMHG
ECHO LVOT PEAK VELOCITY: 1.1 M/S
ECHO LVOT STROKE VOLUME INDEX: 41.4 ML/M2
ECHO LVOT SV: 61.3 ML
ECHO LVOT VTI: 24.1 CM
ECHO MV A VELOCITY: 0.92 M/S
ECHO MV AREA VTI: 2.3 CM2
ECHO MV E DECELERATION TIME (DT): 220.4 MS
ECHO MV E VELOCITY: 0.99 M/S
ECHO MV E/A RATIO: 1.08
ECHO MV E/E' LATERAL: 14.14
ECHO MV E/E' RATIO (AVERAGED): 15.32
ECHO MV E/E' SEPTAL: 16.5
ECHO MV LVOT VTI INDEX: 1.11
ECHO MV MAX VELOCITY: 1.4 M/S
ECHO MV MEAN GRADIENT: 3 MMHG
ECHO MV MEAN VELOCITY: 0.8 M/S
ECHO MV PEAK GRADIENT: 8 MMHG
ECHO MV REGURGITANT ALIASING (NYQUIST) VELOCITY: 25 CM/S
ECHO MV REGURGITANT PEAK GRADIENT: 100 MMHG
ECHO MV REGURGITANT PEAK VELOCITY: 5 M/S
ECHO MV REGURGITANT VELOCITY PISA: 5.7 M/S
ECHO MV REGURGITANT VTIA: 203.1 CM
ECHO MV VTI: 26.8 CM
ECHO PV MAX VELOCITY: 0.8 M/S
ECHO PV PEAK GRADIENT: 2 MMHG
ECHO RV INTERNAL DIMENSION: 3.6 CM
ECHO TV REGURGITANT MAX VELOCITY: 1.9 M/S
ECHO TV REGURGITANT PEAK GRADIENT: 16 MMHG
EOSINOPHIL # BLD: 0.2 K/UL (ref 0–0.4)
EOSINOPHIL NFR BLD: 3 % (ref 0–7)
ERYTHROCYTE [DISTWIDTH] IN BLOOD BY AUTOMATED COUNT: 13.7 % (ref 11.5–14.5)
GLOBULIN SER CALC-MCNC: 3.5 G/DL (ref 2–4)
GLUCOSE SERPL-MCNC: 100 MG/DL (ref 65–100)
HCT VFR BLD AUTO: 38.2 % (ref 35–47)
HGB BLD-MCNC: 12.8 G/DL (ref 11.5–16)
IMM GRANULOCYTES # BLD AUTO: 0 K/UL (ref 0–0.04)
IMM GRANULOCYTES NFR BLD AUTO: 0 % (ref 0–0.5)
LYMPHOCYTES # BLD: 2.1 K/UL (ref 0.8–3.5)
LYMPHOCYTES NFR BLD: 35 % (ref 12–49)
MCH RBC QN AUTO: 31.6 PG (ref 26–34)
MCHC RBC AUTO-ENTMCNC: 33.5 G/DL (ref 30–36.5)
MCV RBC AUTO: 94.3 FL (ref 80–99)
MONOCYTES # BLD: 0.9 K/UL (ref 0–1)
MONOCYTES NFR BLD: 15 % (ref 5–13)
NEUTS SEG # BLD: 2.7 K/UL (ref 1.8–8)
NEUTS SEG NFR BLD: 46 % (ref 32–75)
NRBC # BLD: 0 K/UL (ref 0–0.01)
NRBC BLD-RTO: 0 PER 100 WBC
PHOSPHATE SERPL-MCNC: 3 MG/DL (ref 2.6–4.7)
PLATELET # BLD AUTO: 150 K/UL (ref 150–400)
PMV BLD AUTO: 10.6 FL (ref 8.9–12.9)
POTASSIUM SERPL-SCNC: 4.1 MMOL/L (ref 3.5–5.1)
PROT SERPL-MCNC: 6.1 G/DL (ref 6.4–8.2)
RBC # BLD AUTO: 4.05 M/UL (ref 3.8–5.2)
SODIUM SERPL-SCNC: 136 MMOL/L (ref 136–145)
WBC # BLD AUTO: 5.9 K/UL (ref 3.6–11)

## 2022-09-23 PROCEDURE — 93306 TTE W/DOPPLER COMPLETE: CPT

## 2022-09-23 PROCEDURE — 80053 COMPREHEN METABOLIC PANEL: CPT

## 2022-09-23 PROCEDURE — 36415 COLL VENOUS BLD VENIPUNCTURE: CPT

## 2022-09-23 PROCEDURE — 83880 ASSAY OF NATRIURETIC PEPTIDE: CPT

## 2022-09-23 PROCEDURE — 85025 COMPLETE CBC W/AUTO DIFF WBC: CPT

## 2022-09-23 PROCEDURE — 74011250637 HC RX REV CODE- 250/637: Performed by: HOSPITALIST

## 2022-09-23 PROCEDURE — 97535 SELF CARE MNGMENT TRAINING: CPT | Performed by: OCCUPATIONAL THERAPIST

## 2022-09-23 PROCEDURE — 84100 ASSAY OF PHOSPHORUS: CPT

## 2022-09-23 PROCEDURE — 65270000046 HC RM TELEMETRY

## 2022-09-23 PROCEDURE — 74011000250 HC RX REV CODE- 250: Performed by: PHYSICIAN ASSISTANT

## 2022-09-23 RX ORDER — LISINOPRIL 10 MG/1
10 TABLET ORAL DAILY
Status: DISCONTINUED | OUTPATIENT
Start: 2022-09-24 | End: 2022-09-26 | Stop reason: HOSPADM

## 2022-09-23 RX ORDER — CARVEDILOL 3.12 MG/1
3.12 TABLET ORAL 2 TIMES DAILY WITH MEALS
Status: DISCONTINUED | OUTPATIENT
Start: 2022-09-23 | End: 2022-09-26 | Stop reason: HOSPADM

## 2022-09-23 RX ORDER — FUROSEMIDE 40 MG/1
40 TABLET ORAL 2 TIMES DAILY
Status: DISCONTINUED | OUTPATIENT
Start: 2022-09-23 | End: 2022-09-26 | Stop reason: HOSPADM

## 2022-09-23 RX ADMIN — METRONIDAZOLE 250 MG: 250 TABLET ORAL at 18:34

## 2022-09-23 RX ADMIN — ROSUVASTATIN CALCIUM 5 MG: 10 TABLET, FILM COATED ORAL at 22:50

## 2022-09-23 RX ADMIN — SODIUM CHLORIDE, PRESERVATIVE FREE 10 ML: 5 INJECTION INTRAVENOUS at 22:50

## 2022-09-23 RX ADMIN — METRONIDAZOLE 250 MG: 250 TABLET ORAL at 08:52

## 2022-09-23 RX ADMIN — SODIUM CHLORIDE, PRESERVATIVE FREE 5 ML: 5 INJECTION INTRAVENOUS at 05:52

## 2022-09-23 RX ADMIN — PANTOPRAZOLE SODIUM 40 MG: 40 TABLET, DELAYED RELEASE ORAL at 08:52

## 2022-09-23 RX ADMIN — RIVAROXABAN 15 MG: 15 TABLET, FILM COATED ORAL at 08:52

## 2022-09-23 RX ADMIN — SODIUM CHLORIDE, PRESERVATIVE FREE 10 ML: 5 INJECTION INTRAVENOUS at 13:47

## 2022-09-23 NOTE — PROGRESS NOTES
End of Shift Note    Bedside shift change report given to Katarzyna (oncoming nurse) by Chani Hoyt RN (offgoing nurse). Report included the following information SBAR    Shift worked:  7a - 3p     Shift summary and any significant changes:     Uneventful shift. Concerns for physician to address:  none     Zone phone for oncoming shift:          Activity:  Activity Level: Up with Assistance  Number times ambulated in hallways past shift: 0  Number of times OOB to chair past shift: 2    Cardiac:   Cardiac Monitoring: Yes      Cardiac Rhythm: Ventricular Paced    Access:  Current line(s): PIV     Genitourinary:   Urinary status: voiding    Respiratory:   O2 Device: None (Room air)  Chronic home O2 use?: NO  Incentive spirometer at bedside: N/A       GI:  Last Bowel Movement Date: 09/20/22  Current diet:  ADULT DIET Easy to Chew  Passing flatus: YES  Tolerating current diet: YES       Pain Management:   Patient states pain is manageable on current regimen: YES    Skin:  Gavin Score: 19  Interventions: increase time out of bed and PT/OT consult    Patient Safety:  Fall Score:  Total Score: 5  Interventions: bed/chair alarm, assistive device (walker, cane, etc), gripper socks, pt to call before getting OOB, and stay with me (per policy)  High Fall Risk: Yes    Length of Stay:  Expected LOS: 2d 14h  Actual LOS: [facility] Kala Disla RN

## 2022-09-23 NOTE — PROGRESS NOTES
Hospitalist Progress Note    NAME: Juan Pablo Cruz   :  1/15/1931   MRN:  [id number]     Subjective:   Daily Progress Note: 2022 9:56 AM      Chief complaint: Suspected fall  Patient seen and examined, chart was reviewed. Patient remained comfortable in bed, she appeared little confused to me today. She remained orthostatic yesterday needing IV fluids. BP has improved a little bit today. No repeat orthostatics done yet today. Waiting PT OT to re-evaluate her today.   Still holding her multiple cardiac medications for low BP        Current Facility-Administered Medications   Medication Dose Route Frequency    pantoprazole (PROTONIX) tablet 40 mg  40 mg Oral ACB    rosuvastatin (CRESTOR) tablet 5 mg  5 mg Oral QHS    0.9% sodium chloride infusion  50 mL/hr IntraVENous CONTINUOUS    metroNIDAZOLE (FLAGYL) tablet 250 mg  250 mg Oral BID    rivaroxaban (XARELTO) tablet 15 mg  15 mg Oral DAILY    sodium chloride (NS) flush 5-40 mL  5-40 mL IntraVENous Q8H    sodium chloride (NS) flush 5-40 mL  5-40 mL IntraVENous PRN    acetaminophen (TYLENOL) tablet 650 mg  650 mg Oral Q6H PRN    Or    acetaminophen (TYLENOL) suppository 650 mg  650 mg Rectal Q6H PRN    polyethylene glycol (MIRALAX) packet 17 g  17 g Oral DAILY PRN    ondansetron (ZOFRAN ODT) tablet 4 mg  4 mg Oral Q8H PRN    Or    ondansetron (ZOFRAN) injection 4 mg  4 mg IntraVENous Q6H PRN    midodrine (PROAMATINE) tablet 5 mg  5 mg Oral TID PRN          Objective:     Visit Vitals  BP (!) 114/55 (BP 1 Location: Left upper arm, BP Patient Position: At rest)   Pulse 80   Temp 98.1 °F (36.7 °C)   Resp 19   Ht 4' 9\" (1.448 m)   Wt 57.6 kg (127 lb)   SpO2 99%   BMI 27.48 kg/m²      O2 Device: None (Room air)    Temp (24hrs), Av.9 °F (36.6 °C), Min:97.4 °F (36.3 °C), Max:98.4 °F (36.9 °C)        PHYSICAL EXAM:  General chronic ill-looking thin built and nourished  Neck supple  CVS RRR  Respiratory symmetric expansion  Abdomen soft, ND  Extremities no edema  Neuro alert, normal speech  Psych poor insight  Skin no visible rash        Data Review    Recent Results (from the past 24 hour(s))   CBC WITH AUTOMATED DIFF    Collection Time: 09/23/22  2:18 AM   Result Value Ref Range    WBC 5.9 3.6 - 11.0 K/uL    RBC 4.05 3.80 - 5.20 M/uL    HGB 12.8 11.5 - 16.0 g/dL    HCT 38.2 35.0 - 47.0 %    MCV 94.3 80.0 - 99.0 FL    MCH 31.6 26.0 - 34.0 PG    MCHC 33.5 30.0 - 36.5 g/dL    RDW 13.7 11.5 - 14.5 %    PLATELET 072 645 - 286 K/uL    MPV 10.6 8.9 - 12.9 FL    NRBC 0.0 0  WBC    ABSOLUTE NRBC 0.00 0.00 - 0.01 K/uL    NEUTROPHILS 46 32 - 75 %    LYMPHOCYTES 35 12 - 49 %    MONOCYTES 15 (H) 5 - 13 %    EOSINOPHILS 3 0 - 7 %    BASOPHILS 1 0 - 1 %    IMMATURE GRANULOCYTES 0 0.0 - 0.5 %    ABS. NEUTROPHILS 2.7 1.8 - 8.0 K/UL    ABS. LYMPHOCYTES 2.1 0.8 - 3.5 K/UL    ABS. MONOCYTES 0.9 0.0 - 1.0 K/UL    ABS. EOSINOPHILS 0.2 0.0 - 0.4 K/UL    ABS. BASOPHILS 0.0 0.0 - 0.1 K/UL    ABS. IMM. GRANS. 0.0 0.00 - 0.04 K/UL    DF AUTOMATED     METABOLIC PANEL, COMPREHENSIVE    Collection Time: 09/23/22  2:18 AM   Result Value Ref Range    Sodium 136 136 - 145 mmol/L    Potassium 4.1 3.5 - 5.1 mmol/L    Chloride 102 97 - 108 mmol/L    CO2 30 21 - 32 mmol/L    Anion gap 4 (L) 5 - 15 mmol/L    Glucose 100 65 - 100 mg/dL    BUN 55 (H) 6 - 20 MG/DL    Creatinine 1.17 (H) 0.55 - 1.02 MG/DL    BUN/Creatinine ratio 47 (H) 12 - 20      GFR est AA 53 (L) >60 ml/min/1.73m2    GFR est non-AA 43 (L) >60 ml/min/1.73m2    Calcium 8.6 8.5 - 10.1 MG/DL    Bilirubin, total 0.6 0.2 - 1.0 MG/DL    ALT (SGPT) 21 12 - 78 U/L    AST (SGOT) 32 15 - 37 U/L    Alk.  phosphatase 69 45 - 117 U/L    Protein, total 6.1 (L) 6.4 - 8.2 g/dL    Albumin 2.6 (L) 3.5 - 5.0 g/dL    Globulin 3.5 2.0 - 4.0 g/dL    A-G Ratio 0.7 (L) 1.1 - 2.2     NT-PRO BNP    Collection Time: 09/23/22  2:18 AM   Result Value Ref Range    NT pro-BNP 2,909 (H) <450 PG/ML   PHOSPHORUS    Collection Time: 09/23/22  2:18 AM   Result Value Ref Range    Phosphorus 3.0 2.6 - 4.7 MG/DL   ECHO ADULT COMPLETE    Collection Time: 09/23/22  8:00 AM   Result Value Ref Range    IVSd 1.0 (A) 0.6 - 0.9 cm    LVIDd 4.2 3.9 - 5.3 cm    LVIDs 3.1 cm    LVOT Diameter 1.8 cm    LVPWd 1.3 (A) 0.6 - 0.9 cm    LV Ejection Fraction A4C 46 %    LV EDV A4C 65 mL    LV ESV A4C 35 mL    LVIDd M-mode 4.8 3.9 - 5.3 cm    LVIDs M-mode 3.8 cm    LVOT Peak Gradient 5 mmHg    LVOT Mean Gradient 3 mmHg    LVOT SV 61.3 ml    LVOT Peak Velocity 1.1 m/s    LVOT VTI 24.1 cm    RVIDd 3.6 cm    LA Diameter 2.5 cm    LA Volume 4C 32 22 - 52 mL    AV Area by Peak Velocity 1.4 cm2    AV Area by VTI 1.5 cm2    AR .9 millisecond    AR Max Velocity PISA 4.0 m/s    AV Peak Gradient 17 mmHg    AV Mean Gradient 9 mmHg    AV Peak Velocity 2.1 m/s    AV Mean Velocity 1.4 m/s    AV VTI 41.0 cm    MV Nyquist Velocity 25 cm/s    MV A Velocity 0.92 m/s    MV E Wave Deceleration Time 220.4 ms    MV E Velocity 0.99 m/s    LV E' Lateral Velocity 7 cm/s    LV E' Septal Velocity 6 cm/s    MV Area by VTI 2.3 cm2    MR Peak Gradient 100 mmHg    MR Peak Velocity 5.0 m/s    MV Peak Gradient 8 mmHg    MV Mean Gradient 3 mmHg    MV Max Velocity 1.4 m/s    MV Mean Velocity 0.8 m/s    MV VTI 26.8 cm    MV Regurg Velocity PISA 5.7 m/s    MR .1 cm    PV Peak Gradient 2 mmHg    PV Max Velocity 0.8 m/s    TR Peak Gradient 16 mmHg    TR Max Velocity 1.90 m/s    Ascending Aorta 2.5 cm    Aortic Root 2.1 cm    Fractional Shortening 2D 26 28 - 44 %    LV ESV Index A4C 24 mL/m2    LV EDV Index A4C 44 mL/m2    LVIDd Index 2.84 cm/m2    LVIDs Index 2.09 cm/m2    LV RWT Ratio 0.62     LV Mass 2D 167.4 (A) 67 - 162 g    LV Mass 2D Index 113.1 (A) 43 - 95 g/m2    MV E/A 1.08     E/E' Ratio (Averaged) 15.32     E/E' Lateral 14.14     E/E' Septal 16.50     LVOT Stroke Volume Index 41.4 mL/m2    LVOT Area 2.5 cm2    LA Volume Index 4C 22 16 - 34 mL/m2    LA Size Index 1.69 cm/m2    LA/AO Root Ratio 1.19     Ao Root Index 1.42 cm/m2    Ascending Aorta Index 1.69 cm/m2    AV Velocity Ratio 0.52     LVOT:AV VTI Index 0.59     ELIZABETH/BSA VTI 1.0 cm2/m2    ELIZABETH/BSA Peak Velocity 0.9 cm2/m2    MV:LVOT VTI Index 1.11      No results found for this visit on 09/21/22. All Micro Results       Procedure Component Value Units Date/Time    CULTURE, URINE [[id number]]     Order Status: Sent Specimen: Urine from Clean catch                  Assessment/Plan:     1. Generalized weakness with falls/FTT adult  Multiple falls reported from family likely from Low BP  Right knee pain x-ray neg  Bilateral elbow x-rays negative for acute fracture  CT of the head with no acute abnormality  Urinalysis neg  PT/OT on case  Midodrine as needed for low BP  Remained orthostatic yesterday    2. Hypokalemia-  Repleted    3. Dehydration/CHRISTIAN-s/p IV fluids, monitor creatinine improving, BUN still elevated.     4.  CAD with stents/Chr Sys CHF/Pafib with Biv PPM and ablation  Troponin 100 range, not consistent with ACS per cardiology  Echocardiogram still pending  EKG ventricularly paced  Cardiology on case fu recommendations  Unable to use GDMT meds secondary to low BP  PPM interrogation as per cards  Xarelto  15mg  daily       CODE STATUS: Full Code, considering advanced age and multiple issues and recurrent falls with admissions we will ask palliative evaluation  DVT prophylaxis: SCD  DONNAK Son   Dispo:home vs SNF       JAVIER ?9/24-25   Barriers: Pending medical stability and progress    Signed By: Nicolas Ayers MD     September 23, 2022      pro

## 2022-09-23 NOTE — PROGRESS NOTES
Bedside and Verbal shift change report received from [facility]. Report included the following information SBAR, Kardex, and Recent Results.

## 2022-09-23 NOTE — PROGRESS NOTES
Transition of Care Plan:     RUR: 11%  Disposition: Home with [address] [place] [address] - pending with [facility]  Follow up appointments: PCP & specialists as indicated  DME needed: None - has grab bars, RW, cane, rollator, dressing aids  Transportation at Discharge: [facility] or means to access home: Yes      IM Medicare Letter: Needed at d/c  Is patient a  and connected with the [address]? N/a              If yes, was [employer] transfer form completed and VA notified? N/a  Caregiver Contact: Wood Jefferson, son ([phone])  Discharge Caregiver contacted prior to discharge? Yes  Care Conference needed?: No    Son called and provided CM with [place] perference for [facility] . FOC was completed, verbal consent obtained via phone. Referral sent via [employer] to L[facility].       [email or web address]  Phone: [phone]

## 2022-09-23 NOTE — PROGRESS NOTES
EP/ ARRHYTHMIA/ CARDIOLOGY Progress Note    Patient ID:  Patient: Mary Lovell  MRN: [id number]  Age: 80 y.o.  : 1/15/1931    Date of  Admission: 2022  9:13 AM   PCP:  Cynthia Marte MD  Usual cardiologist:  Teagan Kaur MD    Assessment:   Recurrent falls leading to admission. She has fallen in the past (non-syncopal). Transient hypotension noted here. Possible dehydration and orthostatic hypotension, though does not endorse lightheadedness or dizziness. 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 Ischemic heart disease with remote LAD stent placement in 2007. No angina. Nuclear stress test without ischemia in . Nonischemic cardiomyopathy with ischemic heart diease as noted above, EF 35-40% in . Chronic systolic CHF. Chronic anticoagulation with Xarelto. Full code. Plan:     I interrogated her BIV pacemaker today. The RV cap threshold was high today by auto-testing (3.5Vx0.6ms), I manually tested it and got 2.25Vx0.6ms. Programmed the RV output higher at 4V. The LV tested OK, so I presume if the setting of RV non-capture, she'd have LV pacing still. Her battery is estimated at ~2 months (<1 to 5 month range) at this point and she is pacemaker-dependent. Chronic anticoagulation may be problematic but she is in atrial arrhythmia frequently. I'll defer to Dr. Marcelo [place] on this. Agree with stopping carvedilol, ramipril. Restart furosemide on discharge. Final echo read pending. I'll have Dr. Danni Hernandez F/U on this (he's rounding this weekend). Not worried about the troponin, no evidence of ACS or acute myocardial injury. The pertinent pacemaker interrogation report is in the media section (photos). As above, I would stop her carvedilol and ramipril. She will need urgent F/U with Dr. Marcelo [place] or a NP in his office in 1-2 weeks post-discharge to follow on her device. [x]       High complexity decision making was performed in this patient    Vlad Mcneil is a 80 y.o. female with a history of the above here for consultation for evaluation and treatment. She does not currently report syncope, dizziness, palpitations, orthopnea, PND edema, TIA or stroke symptoms. No chest pain. She has issues getting up out of bed in the AM.  This is when she is most unsteady on her feet, but not dizzy, lightheaded. Currently stable. She is getting gentle hydration. Denies syncope, dizziness, palpitations, chest pain, worsening SOB. Allergies   Allergen Reactions    Adhesive Rash    Clindamycin Rash    Hydrocodone Other (comments)     \"felt loopy\"    Pcn [Penicillins] Rash          Current Facility-Administered Medications   Medication Dose Route Frequency    [Held by provider] carvediloL (COREG) tablet 3.125 mg  3.125 mg Oral BID WITH MEALS    [Held by provider] furosemide (LASIX) tablet 40 mg  40 mg Oral BID    [START ON 9/24/2022] lisinopriL (PRINIVIL, ZESTRIL) tablet 10 mg  10 mg Oral DAILY    pantoprazole (PROTONIX) tablet 40 mg  40 mg Oral ACB    rosuvastatin (CRESTOR) tablet 5 mg  5 mg Oral QHS    metroNIDAZOLE (FLAGYL) tablet 250 mg  250 mg Oral BID    rivaroxaban (XARELTO) tablet 15 mg  15 mg Oral DAILY    sodium chloride (NS) flush 5-40 mL  5-40 mL IntraVENous Q8H    sodium chloride (NS) flush 5-40 mL  5-40 mL IntraVENous PRN    acetaminophen (TYLENOL) tablet 650 mg  650 mg Oral Q6H PRN    Or    acetaminophen (TYLENOL) suppository 650 mg  650 mg Rectal Q6H PRN    polyethylene glycol (MIRALAX) packet 17 g  17 g Oral DAILY PRN    ondansetron (ZOFRAN ODT) tablet 4 mg  4 mg Oral Q8H PRN    Or    ondansetron (ZOFRAN) injection 4 mg  4 mg IntraVENous Q6H PRN    midodrine (PROAMATINE) tablet 5 mg  5 mg Oral TID PRN       Review of Symptoms:  See HPI as well.   General: negative for fever, chills, sweats, +weight loss   Eyes: negative for blurred vision, eye pain, loss of vision, diplopia   Ear Nose and Throat: negative for rhinorrhea, pharyngitis, otalgia, tinnitus, speech or swallowing difficulties   Respiratory: negative for SOB, cough, sputum production, wheezing, GAITAN, pleuritic pain   Cardiology: negative for chest pain, palpitations, orthopnea, PND, edema, syncope   Gastrointestinal: negative for abdominal pain, N/V, dysphagia, change in bowel habits, bleeding   Genitourinary: negative for frequency, urgency, dysuria, hematuria, incontinence   Muskuloskeletal : negative for arthralgia, myalgia   Hematology: negative for easy bruising, bleeding, lymphadenopathy   Dermatological: negative for rash, ulceration, mole change, new lesion   Endocrine: negative for hot flashes or polydipsia   Neurological: negative for headache, dizziness, confusion, focal weakness, paresthesia, memory loss, +gait disturbance   Psychological: negative for anxiety, depression, agitation       Objective:      Physical Exam:  Temp (24hrs), Av.9 °F (36.6 °C), Min:97.4 °F (36.3 °C), Max:98.4 °F (36.9 °C)    Patient Vitals for the past 8 hrs:   Pulse   22 1110 80      Patient Vitals for the past 8 hrs:   Resp   22 1110 19      Patient Vitals for the past 8 hrs:   BP   22 1110 105/71          Intake/Output Summary (Last 24 hours) at 2022 1716  Last data filed at 2022 1039  Gross per 24 hour   Intake 905.84 ml   Output 800 ml   Net 105.84 ml         Nondiaphoretic, not in acute distress, small-framed elderly woman. No scleral icterus, mucous membranes moist, conjuctivae pink, no xanthelasma. L chest BIV pacer site OK. Unlabored, clear to auscultation bilaterally, symmetric air movement. Regular rate and rhythm, no murmur, pericardial rub, knock, or gallop. No JVD or peripheral edema. Palpable radial pulses bilaterally. Extremities without cyanosis or clubbing. Muscle tone and bulk normal for age. Skin warm and dry. No rashes or ulcers.   Neuro grossly nonfocal.  No tremor. Awake and appropriate. CARDIOGRAPHICS and STUDIES, I reviewed:    Telemetry:  No events. Labs:  Recent Labs     09/21/22  1017          No results found for: CHOL, CHOLX, CHLST, CHOLV, HDL, HDLP, LDL, LDLC, DLDLP, TGLX, TRIGL, TRIGP, CHHD, CHHDX  No results for input(s): INR, PTP, APTT, INREXT, INREXT in the last 72 hours. Recent Labs     09/23/22 0218 09/22/22  0513 09/21/22  1017    136 138   K 4.1 3.8 3.3*    101 97   CO2 30 29 35*   BUN 55* 61* 77*   CREA 1.17* 1.21* 1.44*    100 124*   PHOS 3.0  --   --    CA 8.6 9.0 9.8   ALB 2.6* 2.5* 3.2*   WBC 5.9 6.3 9.7   HGB 12.8 12.5 14.7   HCT 38.2 36.7 42.3    140* 155       Recent Labs     09/23/22 0218 09/22/22  0513 09/21/22  1017   AP 69 55 71   TP 6.1* 5.8* 7.2   ALB 2.6* 2.5* 3.2*   GLOB 3.5 3.3 4.0       No components found for: GLPOC  No results for input(s): PH, PCO2, PO2 in the last 72 hours.         Benny Flowers MD  9/23/2022

## 2022-09-23 NOTE — PROGRESS NOTES
Problem: Pressure Injury - Risk of  Goal: *Prevention of pressure injury  Description: Document Gavin Scale and appropriate interventions in the flowsheet. Outcome: Progressing Towards Goal  Note: Pressure Injury Interventions:  Sensory Interventions: Assess need for specialty bed, Assess changes in LOC, Check visual cues for pain, Discuss PT/OT consult with provider, Float heels, Keep linens dry and wrinkle-free    Moisture Interventions: Check for incontinence Q2 hours and as needed, Maintain skin hydration (lotion/cream), Minimize layers, Limit adult briefs    Activity Interventions: Increase time out of bed, Pressure redistribution bed/mattress(bed type), PT/OT evaluation    Mobility Interventions: HOB 30 degrees or less, Float heels, PT/OT evaluation, Pressure redistribution bed/mattress (bed type), Turn and reposition approx. every two hours(pillow and wedges)    Nutrition Interventions: Document food/fluid/supplement intake, Offer support with meals,snacks and hydration    Friction and Shear Interventions: Feet elevated on foot rest, HOB 30 degrees or less, Lift sheet, Lift team/patient mobility team, Foam dressings/transparent film/skin sealants       Problem: Falls - Risk of  Goal: *Absence of Falls  Description: Document Paco Fall Risk and appropriate interventions in the flowsheet.   Outcome: Progressing Towards Goal  Note: Fall Risk Interventions:  Mobility Interventions: Bed/chair exit alarm, OT consult for ADLs, Patient to call before getting OOB, PT Consult for mobility concerns, PT Consult for assist device competence    Mentation Interventions: Adequate sleep, hydration, pain control, Bed/chair exit alarm, Door open when patient unattended, Evaluate medications/consider consulting pharmacy, Increase mobility, More frequent rounding, Reorient patient, Toileting rounds, Update white board    Medication Interventions: Evaluate medications/consider consulting pharmacy, Patient to call before getting OOB, Teach patient to arise slowly, Bed/chair exit alarm    Elimination Interventions: Bed/chair exit alarm, Call light in reach, Patient to call for help with toileting needs, Stay With Me (per policy), Toileting schedule/hourly rounds    History of Falls Interventions: Bed/chair exit alarm, Door open when patient unattended, Evaluate medications/consider consulting pharmacy         Problem: Syncope  Goal: *Absence of injury  Outcome: Progressing Towards Goal  Goal: Decrease or eliminate episodes of syncope  Outcome: Progressing Towards Goal

## 2022-09-23 NOTE — PROGRESS NOTES
Transition of Care Plan:    RUR: 11%  Disposition: Home with [address]   *Family to provide [place] preferences tomorrow  Follow up appointments: PCP & specialists as indicated  DME needed: None - has grab bars, RW, cane, rollator, dressing aids  Transportation at Discharge: [facility] or means to access home: Yes       Medicare Letter: Needed at d/c  Is patient a  and connected with the [place]? N/a              If yes, was [address] transfer form completed and VA notified? N/a  Caregiver Contact: Yohana Zhao, son ([phone])  Discharge Caregiver contacted prior to discharge? Yes  Care Conference needed?: No    Reason for Admission:  Falls, Syncope & collapse                   RUR Score: 11%           Plan for utilizing home health: PT/OT recommendations for [place] PT/OT and assistance from family. Family agreeable with [place] services, plan to provide preferences to CM tomorrow. PCP: First and Last name:  Candis Huizar MD - retired. Reports seeing another provide at same clinic (possibly Dr. Brain Valles). Name of Practice:    Are you a current patient: Yes/No: Yes   Approximate date of last visit: 1 month ago   Can you participate in a virtual visit with your PCP: No                    Current Advanced Directive/Advance Care Plan: Full Code  Advance Care Planning   General Advance Care Planning (ACP) Conversation    Date of Conversation: 9/22/22  Conducted with: Healthcare Decision Maker: Next of Kin by law (only applies in absence of a Healthcare Power of  or Legal Guardian)    Healthcare Decision Maker:     Primary Decision Maker: Mahi Orozco - Son - [phone]  Click here to [address] including selection of the Healthcare Decision Maker Relationship (ie \"Primary\")    Today we documented Decision Maker(s) consistent with Legal Next of Kin hierarchy.     Content/Action Overview:   DECLINED ACP conversation - will revisit periodically   Reviewed DNR/DNI and patient elects Full Code (Attempt Resuscitation)  Length of Voluntary ACP Conversation in minutes:  <16 minutes (Non-Billable)  Jovany Oliveira                     Transition of Care Plan:    - HH PT/OT  - Increased Family Assistance 24/7  - 2nd IM Letter  - Follow-up care appointments  - Son to transport    79 YO White  Female admitted on 9/22/22 for Falls, syncope & collapse. Lives alone in [facility] (ramp entry) in [place], [place]. Son and DIL live 2 miles away. Cousin lives next-door. Cousin calls pt every morning to check-in on her, drives to house if she doesn't answer. DIL is retired and can provide more assistance as well. Pt reportedly is mostly homebound and does not walk outside of the house without supervision (even to mail box). Uses rollator for mobility. Has grab bars, cane, dressing aids, and RW's at home. Son expressed some concern that her memory has worsened over the past 3 weeks, but unsure if related to the dehydration. Preferred Rx is [facility] ([address]). Has Medicare A&B & BCBS supplement. CM attempted to complete assessment with pt via phone, difficulty understanding and requested CM call her son. CM spoke with pt's son Anaya Velasquez, [phone]) to complete assessment. Reviewed PT/OT recommendations for [facility] PT/OT with increased family support (rehab if family unable to assist). Son agreeable with [facility] PT/OT services. They have a family friend who works with [employer], will get recommendations from her and provide back to CM. Writer provided CM's contact info for tomorrow. CM advised of need for pt to have more supervision/assistance at home at d/c. Son verbalized understanding. Son expressed significant concern with pt's medication changes/management and would like to speak to Cardiologist to discuss further tomorrow. Son to provide transport at d/c.      CM will continue to remain available to assist with d/c planning needs                  Care Management Interventions  PCP Verified by CM: Yes  Palliative Care Criteria Met (RRAT>21 & CHF Dx)?: No  Mode of Transport at Discharge: Other (see comment) (son)  Transition of Care Consult (CM Consult): Discharge Planning  Discharge Durable Medical Equipment: No (grab bars, cane, dressing aids, RW, rollator at home)  Health Maintenance Reviewed: Yes  Physical Therapy Consult: Yes  Occupational Therapy Consult: Yes  Speech Therapy Consult: No  Support Systems: Child(johnny), Other Family Member(s)  Confirm Follow Up Transport: Family  The Plan for Transition of Care is Related to the Following Treatment Goals : HH PT/OT  The Patient and/or Patient Representative was Provided with a Choice of Provider and Agrees with the Discharge Plan?: Yes  Name of the Patient Representative Who was Provided with a Choice of Provider and Agrees with the Discharge Plan:  Son  Discharge Location  Patient Expects to be Discharged to[de-identified] Home with home health    Nicholas Spencer, [address] [facility]

## 2022-09-23 NOTE — PROGRESS NOTES
Problem: Pressure Injury - Risk of  Goal: *Prevention of pressure injury  Description: Document Gavin Scale and appropriate interventions in the flowsheet. Outcome: Progressing Towards Goal  Note: Pressure Injury Interventions:  Sensory Interventions: Assess need for specialty bed, Assess changes in LOC, Check visual cues for pain, Discuss PT/OT consult with provider, Float heels, Keep linens dry and wrinkle-free         Activity Interventions: Pressure redistribution bed/mattress(bed type), Increase time out of bed, PT/OT evaluation    Mobility Interventions: Float heels, HOB 30 degrees or less, Pressure redistribution bed/mattress (bed type)    Nutrition Interventions: Discuss nutritional consult with provider, Document food/fluid/supplement intake    Friction and Shear Interventions: Feet elevated on foot rest, HOB 30 degrees or less, Lift sheet, Lift team/patient mobility team, Foam dressings/transparent film/skin sealants                Problem: Patient Education: Go to Patient Education Activity  Goal: Patient/Family Education  Outcome: Progressing Towards Goal     Problem: Patient Education: Go to Patient Education Activity  Goal: Patient/Family Education  Outcome: Progressing Towards Goal     Problem: Falls - Risk of  Goal: *Absence of Falls  Description: Document Paco Fall Risk and appropriate interventions in the flowsheet.   Outcome: Progressing Towards Goal  Note: Fall Risk Interventions:  Mobility Interventions: Bed/chair exit alarm, Patient to call before getting OOB    Mentation Interventions: Bed/chair exit alarm, Door open when patient unattended, More frequent rounding, Update white board    Medication Interventions: Teach patient to arise slowly, Patient to call before getting OOB, Bed/chair exit alarm    Elimination Interventions: Bed/chair exit alarm, Call light in reach, Patient to call for help with toileting needs, Toileting schedule/hourly rounds    History of Falls Interventions: Bed/chair exit alarm, Door open when patient unattended, Investigate reason for fall

## 2022-09-23 NOTE — PROGRESS NOTES
Problem: Pressure Injury - Risk of  Goal: *Prevention of pressure injury  Description: Document Gavin Scale and appropriate interventions in the flowsheet. Outcome: Progressing Towards Goal  Note: Pressure Injury Interventions:  Sensory Interventions: Assess changes in LOC, Assess need for specialty bed, Avoid rigorous massage over bony prominences, Chair cushion, Check visual cues for pain, Discuss PT/OT consult with provider, Float heels, Keep linens dry and wrinkle-free, Maintain/enhance activity level, Minimize linen layers, Monitor skin under medical devices, Pad between skin to skin, Pressure redistribution bed/mattress (bed type), Turn and reposition approx.  every two hours (pillows and wedges if needed)    Moisture Interventions: Absorbent underpads, Apply protective barrier, creams and emollients, Assess need for specialty bed, Check for incontinence Q2 hours and as needed, Contain wound drainage, Internal/External fecal devices, Internal/External urinary devices, Limit adult briefs, Maintain skin hydration (lotion/cream), Minimize layers, Moisture barrier    Activity Interventions: Assess need for specialty bed, Chair cushion, Increase time out of bed, Pressure redistribution bed/mattress(bed type), PT/OT evaluation    Mobility Interventions: Assess need for specialty bed, Chair cushion, Float heels, HOB 30 degrees or less, Pressure redistribution bed/mattress (bed type), PT/OT evaluation, Suspension boots, Trapeze to reposition    Nutrition Interventions: Document food/fluid/supplement intake, Discuss nutritional consult with provider, Offer support with meals,snacks and hydration    Friction and Shear Interventions: Apply protective barrier, creams and emollients, Feet elevated on foot rest, Foam dressings/transparent film/skin sealants, HOB 30 degrees or less, Lift sheet, Lift team/patient mobility team, Minimize layers, Sit at 90-degree angle, Transfer aides:transfer board/Herber lift/ceiling lift, Transferring/repositioning devices

## 2022-09-23 NOTE — PROGRESS NOTES
Problem: Self Care Deficits Care Plan (Adult)  Goal: *Acute Goals and Plan of Care (Insert Text)  Description: FUNCTIONAL STATUS PRIOR TO ADMISSION: Patient was modified independent using a rollator for functional mobility. She lives alone and is mod I for all ADL and I-ADL except driving. She states her son watches her do her medication but she does it herself. Granddaughter lives next door and checks on her every am.    HOME SUPPORT: The patient lived alone with granddaughter and son to provide assistance. Occupational Therapy Goals  Initiated 9/22/2022  1. Patient will perform grooming x 4 tasks standing at sink with modified independence within 7 day(s). 2.  Patient will perform sponge bathing with modified independence within 7 day(s). 3.  Patient will perform upper body dressing and lower body dressing with modified independence within 7 day(s). 4.  Patient will perform toilet transfers with modified independence within 7 day(s). 5.  Patient will perform all aspects of toileting with modified independence within 7 day(s). 6.  Patient will utilize energy conservation techniques during functional activities with min verbal cues within 7 day(s). Outcome: Progressing Towards Goal    OCCUPATIONAL THERAPY TREATMENT  Patient: Sabrina Gabriel (26 y.o. female)  Date: 9/23/2022  Diagnosis: Falls [W19. XXXA]  Syncope and collapse [R55] <principal problem not specified>      Precautions: Fall  Chart, occupational therapy assessment, plan of care, and goals were reviewed. ASSESSMENT  Patient presented up in bathroom with CNA. She is very motivated and was agreeable to continue working on toileting and grooming with OT. Overall she was CGA for transfers and ambulation, performed standing grooming with SBA and setup, was CGA for toileting, and she performed LB dressing with up to CGA. Fair overall tolerance noted for all activity performed with VSS.  Very minimal cueing was required for safety during transfers. PLAN :  Patient continues to benefit from skilled intervention to address the above impairments. Continue treatment per established plan of care. to address goals. Recommendation for discharge: (in order for the patient to meet his/her long term goals)  To be determined: home with [facility] vs SNF rehab pending progress, as she lives ALONE and needs to be mod I for ADL and I-ADL      Equipment recommendations for successful discharge (if) home:TBD in SNF         OBJECTIVE DATA SUMMARY:   Cognitive/Behavioral Status:  Neurologic State: Alert  Orientation Level: Oriented X4  Cognition: Follows commands; Appropriate for age attention/concentration        Safety/Judgement: Decreased awareness of need for safety    Functional Mobility and Transfers for ADLs:  Bed Mobility:  Scooting: Supervision    Transfers:  Sit to Stand: Contact guard assistance  Functional Transfers  Bathroom Mobility: Contact guard assistance (ambulating with RW)  Toilet Transfer : Contact guard assistance  Cues: Tactile cues provided;Verbal cues provided;Visual cues provided       Balance:  Sitting: Intact  Standing: Impaired  Standing - Static: Fair  Standing - Dynamic : Constant support; Fair    ADL Intervention:  Grooming  Position Performed: Standing  Washing Hands: Set-up; Stand-by assistance  Brushing Teeth: Set-up; Stand-by assistance  Brushing/Combing Hair: Set-up; Stand-by assistance     Lower Body Dressing Assistance  Underpants: Contact guard assistance  Socks: Supervision;Set-up  Leg Crossed Method Used: Yes  Position Performed: Seated in chair;Bending forward method;Standing  Cues: Verbal cues provided    Toileting  Toileting Assistance: Contact guard assistance  Bladder Hygiene: Supervision;Set-up  Clothing Management: Contact guard assistance  Cues: Verbal cues provided    Cognitive Retraining  Safety/Judgement: Decreased awareness of need for safety        Pain:  No complaint of pain    Activity Tolerance: Fair  Please refer to the flowsheet for vital signs taken during this treatment.     After treatment patient left in no apparent distress:   Sitting in chair, Call bell within reach, and Bed / chair alarm activated    COMMUNICATION/COLLABORATION:   The patients plan of care was discussed with: Registered Nurse    Zara Vásquez OTR/L  Time Calculation: 17 mins

## 2022-09-23 NOTE — PROGRESS NOTES
Bedside and Verbal shift change report given to [address] (oncoming nurse) by FELY Young RN (offgoing nurse). Report given with SBAR, Kardex, Intake/Output, MAR and Recent Results.

## 2022-09-24 LAB
ALBUMIN SERPL-MCNC: 2.4 G/DL (ref 3.5–5)
ALBUMIN/GLOB SERPL: 0.7 {RATIO} (ref 1.1–2.2)
ALP SERPL-CCNC: 60 U/L (ref 45–117)
ALT SERPL-CCNC: 20 U/L (ref 12–78)
ANION GAP SERPL CALC-SCNC: 6 MMOL/L (ref 5–15)
AST SERPL-CCNC: 28 U/L (ref 15–37)
BASOPHILS # BLD: 0 K/UL (ref 0–0.1)
BASOPHILS NFR BLD: 1 % (ref 0–1)
BILIRUB SERPL-MCNC: 0.8 MG/DL (ref 0.2–1)
BUN SERPL-MCNC: 38 MG/DL (ref 6–20)
BUN/CREAT SERPL: 38 (ref 12–20)
CALCIUM SERPL-MCNC: 9.2 MG/DL (ref 8.5–10.1)
CHLORIDE SERPL-SCNC: 110 MMOL/L (ref 97–108)
CO2 SERPL-SCNC: 24 MMOL/L (ref 21–32)
CREAT SERPL-MCNC: 1 MG/DL (ref 0.55–1.02)
DIFFERENTIAL METHOD BLD: ABNORMAL
EOSINOPHIL # BLD: 0.2 K/UL (ref 0–0.4)
EOSINOPHIL NFR BLD: 3 % (ref 0–7)
ERYTHROCYTE [DISTWIDTH] IN BLOOD BY AUTOMATED COUNT: 13.8 % (ref 11.5–14.5)
GLOBULIN SER CALC-MCNC: 3.5 G/DL (ref 2–4)
GLUCOSE SERPL-MCNC: 111 MG/DL (ref 65–100)
HCT VFR BLD AUTO: 38.7 % (ref 35–47)
HGB BLD-MCNC: 12.9 G/DL (ref 11.5–16)
IMM GRANULOCYTES # BLD AUTO: 0 K/UL (ref 0–0.04)
IMM GRANULOCYTES NFR BLD AUTO: 0 % (ref 0–0.5)
LYMPHOCYTES # BLD: 2 K/UL (ref 0.8–3.5)
LYMPHOCYTES NFR BLD: 33 % (ref 12–49)
MCH RBC QN AUTO: 31.8 PG (ref 26–34)
MCHC RBC AUTO-ENTMCNC: 33.3 G/DL (ref 30–36.5)
MCV RBC AUTO: 95.3 FL (ref 80–99)
MONOCYTES # BLD: 0.8 K/UL (ref 0–1)
MONOCYTES NFR BLD: 14 % (ref 5–13)
NEUTS SEG # BLD: 2.9 K/UL (ref 1.8–8)
NEUTS SEG NFR BLD: 50 % (ref 32–75)
NRBC # BLD: 0 K/UL (ref 0–0.01)
NRBC BLD-RTO: 0 PER 100 WBC
PLATELET # BLD AUTO: 130 K/UL (ref 150–400)
PMV BLD AUTO: 10.7 FL (ref 8.9–12.9)
POTASSIUM SERPL-SCNC: 4.3 MMOL/L (ref 3.5–5.1)
PROT SERPL-MCNC: 5.9 G/DL (ref 6.4–8.2)
RBC # BLD AUTO: 4.06 M/UL (ref 3.8–5.2)
SODIUM SERPL-SCNC: 140 MMOL/L (ref 136–145)
WBC # BLD AUTO: 5.8 K/UL (ref 3.6–11)

## 2022-09-24 PROCEDURE — 65270000046 HC RM TELEMETRY

## 2022-09-24 PROCEDURE — 36415 COLL VENOUS BLD VENIPUNCTURE: CPT

## 2022-09-24 PROCEDURE — 74011250637 HC RX REV CODE- 250/637: Performed by: HOSPITALIST

## 2022-09-24 PROCEDURE — 85025 COMPLETE CBC W/AUTO DIFF WBC: CPT

## 2022-09-24 PROCEDURE — 74011000250 HC RX REV CODE- 250: Performed by: PHYSICIAN ASSISTANT

## 2022-09-24 PROCEDURE — 80053 COMPREHEN METABOLIC PANEL: CPT

## 2022-09-24 RX ADMIN — ROSUVASTATIN CALCIUM 5 MG: 10 TABLET, FILM COATED ORAL at 22:09

## 2022-09-24 RX ADMIN — SODIUM CHLORIDE, PRESERVATIVE FREE 5 ML: 5 INJECTION INTRAVENOUS at 06:52

## 2022-09-24 RX ADMIN — SODIUM CHLORIDE, PRESERVATIVE FREE 10 ML: 5 INJECTION INTRAVENOUS at 22:10

## 2022-09-24 RX ADMIN — METRONIDAZOLE 250 MG: 250 TABLET ORAL at 09:11

## 2022-09-24 RX ADMIN — PANTOPRAZOLE SODIUM 40 MG: 40 TABLET, DELAYED RELEASE ORAL at 09:11

## 2022-09-24 RX ADMIN — LISINOPRIL 10 MG: 10 TABLET ORAL at 09:11

## 2022-09-24 RX ADMIN — METRONIDAZOLE 250 MG: 250 TABLET ORAL at 18:00

## 2022-09-24 RX ADMIN — SODIUM CHLORIDE, PRESERVATIVE FREE 10 ML: 5 INJECTION INTRAVENOUS at 14:09

## 2022-09-24 RX ADMIN — RIVAROXABAN 15 MG: 15 TABLET, FILM COATED ORAL at 09:11

## 2022-09-24 NOTE — PROGRESS NOTES
Cardiology Progress Note      9/24/2022 1:41 PM    Admit Date: 9/21/2022          Subjective: No new c/o this am          Visit Vitals  BP (!) 173/64   Pulse 80   Temp 97.5 °F (36.4 °C)   Resp 18   Ht 4' 9\" (1.448 m)   Wt 57.6 kg (127 lb)   SpO2 97%   BMI 27.48 kg/m²     09/22 1901 - 09/24 0700  In: 905.8 [I.V.:905.8]  Out: 1100 [Urine:1100]        Objective:      Physical Exam:  VS as above  Chest scattered bilat crackles  Card RRR 2/6 LING no gallop     Data Review:   Labs:      Hgb 12.9  BUN 38  Creat 1.0     Telemetry: V pacing R 80 with underlying afib       Assessment:     Recurrent falls leading to admission. She has fallen in the past (non-syncopal). Transient hypotension noted here. Possible dehydration and orthostatic hypotension, though does not endorse lightheadedness or dizziness. 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 Ischemic heart disease with remote LAD stent placement in 11/2007. No angina. Nuclear stress test without ischemia in 2014. Nonischemic cardiomyopathy with ischemic heart diease as noted above, EF 35-40% in 2014. Chronic systolic CHF. EF 45-50%  Chronic anticoagulation with Xarelto. Full code. Mod- severe mitral regurg     Plan:  Cont current Rx. Nothing to add today.  Needs to call Dr Mehul Martinez Monday to arrange early pacer F/U

## 2022-09-24 NOTE — PROGRESS NOTES
Bedside and Verbal shift change report given to [address] (oncoming nurse) by FELY Diamond RN (offgoing nurse). Report given with SBAR, Kardex, Intake/Output, MAR and Recent Results.

## 2022-09-24 NOTE — PROGRESS NOTES
Please see my note earlier today. Echo resulted--EF 45-50%, mod-severe MR. This result doesn't change my recommendations, hopefully discharge soon as able.     Signed By: Wero Vivar MD     September 23, 2022

## 2022-09-24 NOTE — PROGRESS NOTES
Problem: Pressure Injury - Risk of  Goal: *Prevention of pressure injury  Description: Document Gavin Scale and appropriate interventions in the flowsheet. Outcome: Progressing Towards Goal  Note: Pressure Injury Interventions:  Sensory Interventions: Assess changes in LOC, Assess need for specialty bed, Avoid rigorous massage over bony prominences, Chair cushion, Check visual cues for pain, Discuss PT/OT consult with provider, Float heels, Keep linens dry and wrinkle-free, Maintain/enhance activity level, Minimize linen layers, Monitor skin under medical devices, Pad between skin to skin, Pressure redistribution bed/mattress (bed type), Turn and reposition approx. every two hours (pillows and wedges if needed)    Moisture Interventions: Internal/External urinary devices, Minimize layers, Check for incontinence Q2 hours and as needed    Activity Interventions: PT/OT evaluation, Increase time out of bed    Mobility Interventions: PT/OT evaluation, HOB 30 degrees or less    Nutrition Interventions: Document food/fluid/supplement intake    Friction and Shear Interventions: Apply protective barrier, creams and emollients, Feet elevated on foot rest, Foam dressings/transparent film/skin sealants, HOB 30 degrees or less, Lift sheet, Lift team/patient mobility team, Minimize layers, Sit at 90-degree angle, Transfer aides:transfer board/Herber lift/ceiling lift, Transferring/repositioning devices                Problem: Patient Education: Go to Patient Education Activity  Goal: Patient/Family Education  Outcome: Progressing Towards Goal     Problem: Falls - Risk of  Goal: *Absence of Falls  Description: Document Rocha Blades Fall Risk and appropriate interventions in the flowsheet.   Outcome: Progressing Towards Goal  Note: Fall Risk Interventions:  Mobility Interventions: Patient to call before getting OOB, Bed/chair exit alarm    Mentation Interventions: Bed/chair exit alarm, Door open when patient unattended, More frequent rounding, Reorient patient, Update white board    Medication Interventions: Bed/chair exit alarm, Teach patient to arise slowly, Patient to call before getting OOB    Elimination Interventions: Bed/chair exit alarm, Call light in reach, Toileting schedule/hourly rounds, Patient to call for help with toileting needs    History of Falls Interventions: Bed/chair exit alarm, Door open when patient unattended, Investigate reason for fall         Problem: Syncope  Goal: *Absence of injury  Outcome: Progressing Towards Goal  Goal: Decrease or eliminate episodes of syncope  Outcome: Progressing Towards Goal

## 2022-09-24 NOTE — PROGRESS NOTES
1500: Assumed care of pt    End of Shift Note    Bedside shift change report given to [facility] (oncoming nurse) by Medhat Pitts (offgoing nurse). Report included the following information SBAR    Shift worked:  3P-7P     Shift summary and any significant changes:     UNEVENTFUL SHIFT     Concerns for physician to address:  NONE     Zone phone for oncoming shift:          Activity:  Activity Level: Up with Assistance  Number times ambulated in hallways past shift: 0  Number of times OOB to chair past shift: 2    Cardiac:   Cardiac Monitoring: Yes      Cardiac Rhythm: Ventricular Paced    Access:  Current line(s): PIV     Genitourinary:   Urinary status: voiding    Respiratory:   O2 Device: None (Room air)  Chronic home O2 use?: NO  Incentive spirometer at bedside: YES       GI:  Last Bowel Movement Date: 09/23/22  Current diet:  ADULT DIET Easy to Chew  Passing flatus: YES  Tolerating current diet: YES       Pain Management:   Patient states pain is manageable on current regimen: YES    Skin:  Gavin Score: 19  Interventions: increase time out of bed, PT/OT consult, and internal/external urinary devices    Patient Safety:  Fall Score:  Total Score: 5  Interventions: bed/chair alarm, assistive device (walker, cane, etc), gripper socks, pt to call before getting OOB, and stay with me (per policy)  High Fall Risk: Yes    Length of Stay:  Expected LOS: 2d 14h  Actual LOS: [address]

## 2022-09-24 NOTE — PROGRESS NOTES
Hospitalist Progress Note    NAME: Sylvester Devi   :  1/15/1931   MRN:  [id number]       Assessment / Plan:  1. Generalized weakness with falls/FTT adult  Multiple falls reported from family likely from Low BP  Right knee pain x-ray neg  Bilateral elbow x-rays negative for acute fracture  CT of the head with no acute abnormality  Urinalysis neg  PT/OT on case  Midodrine as needed for low BP  Repeat orthostatic vital signs    2. Hypokalemia-  Repleted    3. Dehydration/CHRISTIAN-s/p IV fluids, monitor creatinine improving, BUN still elevated. 4.  CAD with stents/Chr Sys CHF/Pafib with Biv PPM and ablation  Troponin 100 range, not consistent with ACS per cardiology  Echocardiogram  showed ef 45-50%  EKG ventricularly paced  Cardiology on case fu recommendations  Unable to use GDMT meds secondary to low BP  PPM interrogation as per cards showed    Her battery is estimated at ~2 months (<1 to 5 month range) at this point and she is pacemaker-dependent. Patient needs an urgent appointment with his cardiology  Xarelto  15mg  daily   Cards  recommended to stop the Coreg and lisinopril, restarted Lasix  Will defer continuation of the anticoagulation to cards, due to high risk of fall    25.0 - 29.9 Overweight / Body mass index is 27.48 kg/m². Estimated discharge date:   Barriers:  Left voicemail for son with callback number   Updated son , all questions answered   Code status: Full  Prophylaxis:  xarelto   Recommended Disposition:  PT, OT, RN     Subjective:     Chief Complaint / Reason for Physician Visit   FU  orthostatic hypotension, recurrent falls discussed with RN events overnight.    No acute issues   Review of Systems:  Symptom Y/N Comments  Symptom Y/N Comments   Fever/Chills n   Chest Pain n    Poor Appetite    Edema     Cough    Abdominal Pain n    Sputum    Joint Pain     SOB/GAITAN n   Pruritis/Rash     Nausea/vomit n   Tolerating PT/OT     Diarrhea    Tolerating Diet y    Constipation Other       Could NOT obtain due to:      Objective:     VITALS:   Last 24hrs VS reviewed since prior progress note. Most recent are:  Patient Vitals for the past 24 hrs:   Temp Pulse Resp BP SpO2   09/24/22 0800 -- 81 -- -- --   09/24/22 0428 97.5 °F (36.4 °C) 80 18 (!) 125/52 97 %   09/23/22 2240 97.4 °F (36.3 °C) 80 18 (!) 136/58 96 %   09/23/22 1941 98 °F (36.7 °C) 80 20 116/61 99 %   09/23/22 1623 97.8 °F (36.6 °C) 80 23 120/60 99 %   09/23/22 1110 97.9 °F (36.6 °C) 80 19 105/71 98 %       Intake/Output Summary (Last 24 hours) at 9/24/2022 0904  Last data filed at 9/24/2022 [id number]  Gross per 24 hour   Intake 99.17 ml   Output 300 ml   Net -200.83 ml        I had a face to face encounter and independently examined this patient on 9/24/2022, as outlined below:  PHYSICAL EXAM:  General: WD, WN. Alert, cooperative, no acute distress    EENT:  EOMI. Anicteric sclerae. MMM  Resp:  CTA bilaterally, no wheezing or rales. No accessory muscle use  CV:  Regular  rhythm,  No edema  GI:  Soft, Non distended, Non tender. +Bowel sounds  Neurologic:  Alert and oriented X 3, normal speech,   Psych:   Good insight. Not anxious nor agitated  Skin:  No rashes. No jaundice    Reviewed most current lab test results and cultures  YES  Reviewed most current radiology test results   YES  Review and summation of old records today    NO  Reviewed patient's current orders and MAR    YES  PMH/SH reviewed - no change compared to H&P  ________________________________________________________________________  Care Plan discussed with:    Comments   Patient x    Family      RN x    Care Manager     Consultant                        Multidiciplinary team rounds were held today with , nursing, pharmacist and clinical coordinator. Patient's plan of care was discussed; medications were reviewed and discharge planning was addressed.      ________________________________________________________________________  Total NON critical care TIME:  36   Minutes    Total CRITICAL CARE TIME Spent:   Minutes non procedure based      Comments   >50% of visit spent in counseling and coordination of care     ________________________________________________________________________  Francesca Mason MD     Procedures: see electronic medical records for all procedures/Xrays and details which were not copied into this note but were reviewed prior to creation of Plan. LABS:  I reviewed today's most current labs and imaging studies.   Pertinent labs include:  Recent Labs     09/24/22  0443 09/23/22  0218 09/22/22  0513   WBC 5.8 5.9 6.3   HGB 12.9 12.8 12.5   HCT 38.7 38.2 36.7   * 150 140*     Recent Labs     09/24/22  0443 09/23/22  0218 09/22/22  0513 09/21/22  1017    136 136 138   K 4.3 4.1 3.8 3.3*   * 102 101 97   CO2 24 30 29 35*   * 100 100 124*   BUN 38* 55* 61* 77*   CREA 1.00 1.17* 1.21* 1.44*   CA 9.2 8.6 9.0 9.8   MG  --   --   --  2.2   PHOS  --  3.0  --   --    ALB 2.4* 2.6* 2.5* 3.2*   TBILI 0.8 0.6 0.5 0.6   ALT 20 21 20 22       Signed: Francesca Mason MD

## 2022-09-25 PROCEDURE — 65270000046 HC RM TELEMETRY

## 2022-09-25 PROCEDURE — 74011000250 HC RX REV CODE- 250: Performed by: PHYSICIAN ASSISTANT

## 2022-09-25 PROCEDURE — 74011250637 HC RX REV CODE- 250/637: Performed by: HOSPITALIST

## 2022-09-25 RX ADMIN — FUROSEMIDE 40 MG: 40 TABLET ORAL at 08:40

## 2022-09-25 RX ADMIN — ROSUVASTATIN CALCIUM 5 MG: 10 TABLET, FILM COATED ORAL at 21:32

## 2022-09-25 RX ADMIN — METRONIDAZOLE 250 MG: 250 TABLET ORAL at 08:35

## 2022-09-25 RX ADMIN — FUROSEMIDE 40 MG: 40 TABLET ORAL at 17:03

## 2022-09-25 RX ADMIN — PANTOPRAZOLE SODIUM 40 MG: 40 TABLET, DELAYED RELEASE ORAL at 08:35

## 2022-09-25 RX ADMIN — SODIUM CHLORIDE, PRESERVATIVE FREE 10 ML: 5 INJECTION INTRAVENOUS at 21:32

## 2022-09-25 RX ADMIN — RIVAROXABAN 15 MG: 15 TABLET, FILM COATED ORAL at 08:35

## 2022-09-25 RX ADMIN — SODIUM CHLORIDE, PRESERVATIVE FREE 10 ML: 5 INJECTION INTRAVENOUS at 04:01

## 2022-09-25 RX ADMIN — METRONIDAZOLE 250 MG: 250 TABLET ORAL at 17:03

## 2022-09-25 NOTE — PROGRESS NOTES
End of Shift Note    Bedside shift change report given to Starla Houston RN (oncoming nurse) by Janeth Veras RN (offgoing nurse).   Report included the following information SBAR    Shift worked:  9980-4617     Shift summary and any significant changes:     Uneventful night     Concerns for physician to address:       Zone phone for oncoming shift:            Janeth Veras RN

## 2022-09-25 NOTE — PROGRESS NOTES
Hospitalist Progress Note    NAME: Blossom Ramírez   :  1/15/1931   MRN:  [id number]       Assessment / Plan:  1. Generalized weakness with falls/FTT adult  Multiple falls reported from family likely from Low BP  Right knee pain x-ray neg  Bilateral elbow x-rays negative for acute fracture  CT of the head with no acute abnormality  Urinalysis neg  PT/OT on case  Midodrine as needed for low BP  Repeat orthostatic vital signs    2. Hypokalemia-  Repleted    3. Dehydration/CHRISTIAN-s/p IV fluids, monitor creatinine improving, BUN still elevated. 4.  CAD with stents/Chr Sys CHF/Pafib with Biv PPM and ablation  Troponin 100 range, not consistent with ACS per cardiology  Echocardiogram  showed ef 45-50%  EKG ventricularly paced  Cardiology on case fu recommendations  Unable to use GDMT meds secondary to low BP  PPM interrogation as per cards showed    Her battery is estimated at ~2 months (<1 to 5 month range) at this point and she is pacemaker-dependent. Patient needs an urgent appointment with his cardiology  Xarelto  15mg  daily   Cards  recommended to stop the Coreg and lisinopril, restarted Lasix  Will defer continuation of the anticoagulation to cards, due to high risk of fall    25.0 - 29.9 Overweight / Body mass index is 27.48 kg/m². Estimated discharge date:   Barriers: [facility] set up and [facility] appointment   Left voicemail for son with callback number   Updated son , all questions answered   Code status: Full  Prophylaxis:  xarelto   Recommended Disposition:  PT, OT, RN     Subjective:     Chief Complaint / Reason for Physician Visit   FU  orthostatic hypotension, recurrent falls discussed with RN events overnight.    No acute issues   Review of Systems:  Symptom Y/N Comments  Symptom Y/N Comments   Fever/Chills n   Chest Pain n    Poor Appetite    Edema     Cough    Abdominal Pain n    Sputum    Joint Pain     SOB/GAITAN n   Pruritis/Rash     Nausea/vomit n   Tolerating PT/OT     Diarrhea Tolerating Diet y    Constipation    Other       Could NOT obtain due to:      Objective:     VITALS:   Last 24hrs VS reviewed since prior progress note. Most recent are:  Patient Vitals for the past 24 hrs:   Temp Pulse Resp BP SpO2   09/25/22 0838 -- 80 25 (!) 124/58 100 %   09/25/22 0351 98 °F (36.7 °C) 80 18 126/62 96 %   09/24/22 2353 97.7 °F (36.5 °C) 80 15 113/60 97 %   09/24/22 2012 98 °F (36.7 °C) 80 20 (!) 116/56 98 %   09/24/22 1824 -- 82 -- 126/69 --   09/24/22 1821 -- 82 -- 123/60 --   09/24/22 1817 98 °F (36.7 °C) 82 19 (!) 103/59 98 %   09/24/22 1600 -- 82 -- -- --   09/24/22 1200 -- 80 -- -- --   09/24/22 0911 -- 81 -- (!) 173/64 --       No intake or output data in the 24 hours ending 09/25/22 0906       I had a face to face encounter and independently examined this patient on 9/25/2022, as outlined below:  PHYSICAL EXAM:  General: WD, WN. Alert, cooperative, no acute distress    EENT:  EOMI. Anicteric sclerae. MMM  Resp:  CTA bilaterally, no wheezing or rales. No accessory muscle use  CV:  Regular  rhythm,  No edema  GI:  Soft, Non distended, Non tender. +Bowel sounds  Neurologic:  Alert and oriented X 3, normal speech,   Psych:   Good insight. Not anxious nor agitated  Skin:  No rashes. No jaundice    Reviewed most current lab test results and cultures  YES  Reviewed most current radiology test results   YES  Review and summation of old records today    NO  Reviewed patient's current orders and MAR    YES  PMH/SH reviewed - no change compared to H&P  ________________________________________________________________________  Care Plan discussed with:    Comments   Patient x    Family      RN x    Care Manager     Consultant                        Multidiciplinary team rounds were held today with , nursing, pharmacist and clinical coordinator. Patient's plan of care was discussed; medications were reviewed and discharge planning was addressed. ________________________________________________________________________  Total NON critical care TIME:  36   Minutes    Total CRITICAL CARE TIME Spent:   Minutes non procedure based      Comments   >50% of visit spent in counseling and coordination of care     ________________________________________________________________________  Gustavo Garay MD     Procedures: see electronic medical records for all procedures/Xrays and details which were not copied into this note but were reviewed prior to creation of Plan. LABS:  I reviewed today's most current labs and imaging studies.   Pertinent labs include:  Recent Labs     09/24/22 0443 09/23/22 0218   WBC 5.8 5.9   HGB 12.9 12.8   HCT 38.7 38.2   * 150       Recent Labs     09/24/22  0443 09/23/22 0218    136   K 4.3 4.1   * 102   CO2 24 30   * 100   BUN 38* 55*   CREA 1.00 1.17*   CA 9.2 8.6   PHOS  --  3.0   ALB 2.4* 2.6*   TBILI 0.8 0.6   ALT 20 21         Signed: Gustavo Garay MD

## 2022-09-25 NOTE — PROGRESS NOTES
Problem: Falls - Risk of  Goal: *Absence of Falls  Description: Document Natali Ground Fall Risk and appropriate interventions in the flowsheet.   Outcome: Progressing Towards Goal  Note: Fall Risk Interventions:  Mobility Interventions: Patient to call before getting OOB, PT Consult for mobility concerns, PT Consult for assist device competence    Mentation Interventions: Bed/chair exit alarm, Adequate sleep, hydration, pain control    Medication Interventions: Patient to call before getting OOB, Teach patient to arise slowly, Bed/chair exit alarm    Elimination Interventions: Bed/chair exit alarm, Call light in reach, Patient to call for help with toileting needs    History of Falls Interventions: Bed/chair exit alarm, Door open when patient unattended

## 2022-09-25 NOTE — PROGRESS NOTES
Cardiology Progress Note      9/25/2022 1:52 PM    Admit Date: 9/21/2022          Subjective: Mild dizziness earlier. No other c/o         Visit Vitals  BP 96/73   Pulse 80   Temp 97.8 °F (36.6 °C)   Resp 24   Ht 4' 9\" (1.448 m)   Wt 57.6 kg (127 lb)   SpO2 100%   BMI 27.48 kg/m²     09/23 1901 - 09/25 0700  In: 200 [P.O.:200]  Out: 300 [Urine:300]        Objective:      Physical Exam:  VS as above  Chest CTA  Card RRR 2/6 LING no gallop  Neuro awake and alert     Data Review:   Labs:  No results found for this or any previous visit (from the past 24 hour(s)). Telemetry: V pacing with underlying afib R 80       Assessment:     Recurrent falls leading to admission. She has fallen in the past (non-syncopal). Transient hypotension noted here. Possible dehydration and orthostatic hypotension  [employer] BIV pacemaker 5/2009 with AV node ablation for paroxysmal atrial tachycardia not medically-controlled in 2011. Battery near end of life   Ischemic heart disease with remote LAD stent placement in 11/2007. No angina. Nuclear stress test without ischemia in 2014. Chronic systolic CHF. EF 45-50%, EF was around 35%  Chronic anticoagulation with Xarelto. Full code. Mod- severe mitral regurg      Plan:  Cont current Rx. Could be d/c ed from cardiac standpoint.  Needs early F/U with her regular EP Dr Italia Foley to arrange gen change

## 2022-09-26 VITALS
TEMPERATURE: 97.5 F | HEART RATE: 81 BPM | SYSTOLIC BLOOD PRESSURE: 119 MMHG | BODY MASS INDEX: 27.82 KG/M2 | HEIGHT: 57 IN | RESPIRATION RATE: 18 BRPM | OXYGEN SATURATION: 98 % | DIASTOLIC BLOOD PRESSURE: 89 MMHG | WEIGHT: 128.97 LBS

## 2022-09-26 PROCEDURE — 74011250637 HC RX REV CODE- 250/637: Performed by: HOSPITALIST

## 2022-09-26 PROCEDURE — 74011000250 HC RX REV CODE- 250: Performed by: PHYSICIAN ASSISTANT

## 2022-09-26 RX ORDER — MIDODRINE HYDROCHLORIDE 5 MG/1
5 TABLET ORAL
Qty: 90 TABLET | Refills: 0 | Status: SHIPPED | OUTPATIENT
Start: 2022-09-26 | End: 2022-10-26

## 2022-09-26 RX ADMIN — PANTOPRAZOLE SODIUM 40 MG: 40 TABLET, DELAYED RELEASE ORAL at 08:16

## 2022-09-26 RX ADMIN — METRONIDAZOLE 250 MG: 250 TABLET ORAL at 08:16

## 2022-09-26 RX ADMIN — SODIUM CHLORIDE, PRESERVATIVE FREE 10 ML: 5 INJECTION INTRAVENOUS at 05:23

## 2022-09-26 RX ADMIN — FUROSEMIDE 40 MG: 40 TABLET ORAL at 08:16

## 2022-09-26 RX ADMIN — RIVAROXABAN 15 MG: 15 TABLET, FILM COATED ORAL at 08:16

## 2022-09-26 NOTE — DISCHARGE SUMMARY
Hospitalist Discharge Summary     Patient ID:  Guille Hodges  [id number]  63 y.o.  1/15/1931  9/21/2022    PCP on record: Haja Castellon MD    Admit date: 9/21/2022  Discharge date and time: 9/26/2022    DISCHARGE DIAGNOSIS:   Generalized weakness with falls/FTT adult  2. Hypokalemia-    3. Dehydration/CHRISTIAN-  4. CAD with stents/Chr Sys CHF/Pafib with Biv PPM and ablation         CONSULTATIONS:  IP CONSULT TO CARDIOLOGY  IP CONSULT TO CARDIOLOGY    Excerpted HPI from H&P of Joseph Boyce MD:  Guille Hodges is a 80 y.o. female who presents to the emergency department as she resides alone and has had multiple ground-level falls. She normally ambulates with a rolling walker although this has been more difficult according to the family is present at the bedside. She denies any pain with the exception of her right knee. Elbow x-rays were done bilaterally with no obvious acute injury. Right knee x-ray and chest x-ray pending.        ______________________________________________________________________  DISCHARGE SUMMARY/HOSPITAL COURSE:  for full details see H&P, daily progress notes, labs, consult notes. .  Generalized weakness with falls/FTT adult  Multiple falls reported from family likely from Low BP  Right knee pain x-ray neg  Bilateral elbow x-rays negative for acute fracture  CT of the head with no acute abnormality  Urinalysis neg  PT/OT on case  Midodrine as needed for low BP      2. Hypokalemia-  Repleted    3. Dehydration/CHRISTIAN-s/p IV fluids, monitor creatinine improving, BUN still elevated.     4.  CAD with stents/Chr Sys CHF/Pafib with Biv PPM and ablation  Troponin 100 range, not consistent with ACS per cardiology  Echocardiogram  showed ef 45-50%  EKG ventricularly paced  Cardiology on case fu recommendations  Unable to use GDMT meds secondary to low BP  PPM interrogation as per cards showed    Her battery is estimated at ~2 months (<1 to 5 month range) at this point and she is pacemaker-dependent. Patient needs an urgent appointment with his cardiology  Xarelto  15mg  daily   Cards  recommended to stop the Coreg and lisinopril, restarted Lasix  Will defer continuation of the anticoagulation to patient own  cardiology , due to high risk of fall             _______________________________________________________________________  Patient seen and examined by me on discharge day. Pertinent Findings:  Gen:    Not in distress  Chest: Clear lungs  CVS:   Regular rhythm. No edema  Abd:  Soft, not distended, not tender  Neuro:  Alert, ox3  _______________________________________________________________________  DISCHARGE MEDICATIONS:   Current Discharge Medication List        START taking these medications    Details   midodrine (PROAMATINE) 5 mg tablet Take 1 Tablet by mouth three (3) times daily as needed for PRN Reason (Other) (systolic BP less than 90) for up to 30 days. Qty: 90 Tablet, Refills: 0  Start date: 9/26/2022, End date: 10/26/2022           CONTINUE these medications which have NOT CHANGED    Details   metroNIDAZOLE (FLAGYL) 250 mg tablet Take 250 mg by mouth two (2) times a day. rivaroxaban (XARELTO) 15 mg tab tablet Take 15 mg by mouth daily (with breakfast). furosemide (LASIX) 40 mg tablet Take 40 mg by mouth two (2) times a day. lansoprazole (PREVACID) 15 mg capsule Take  by mouth Daily (before breakfast). rosuvastatin (CRESTOR) 5 mg tablet Take 5 mg by mouth nightly. STOP taking these medications       carvediloL (COREG) 3.125 mg tablet Comments:   Reason for Stopping:         ramipril (ALTACE) 2.5 mg capsule Comments:   Reason for Stopping:                 Patient Follow Up Instructions:    Activity: Activity as tolerated  Diet: Cardiac Diet  Wound Care: None needed      Follow-up Information       Follow up With Specialties Details Why Ric Neri MD Family Medicine  The office will contact you with a follow up appointment. If you have not heard from the office in 1-2 business days, call the office to follow up on your appointment. [address]  [phone]      Dr. Cecy Garza. [place]  Follow up The office will contact you to schedule your  follow up appointment. Call the office with any concerns. [facility], [address]  Phone: [phone]    [address] [place] [address]  Follow up This is your home health agency, Call them with any home health concerns.  [facility], [address]   [phone]          ________________________________________________________________    Risk of deterioration: High    Condition at Discharge:  Stable  __________________________________________________________________    Disposition  Home with family and home health services    ____________________________________________________________________    Code Status: Full Code  ___________________________________________________________________      Total time in minutes spent coordinating this discharge (includes going over instructions, follow-up, prescriptions, and preparing report for sign off to her PCP) :  >30 minutes    Signed:  Aron Ch MD

## 2022-09-26 NOTE — DISCHARGE INSTRUCTIONS
DISCHARGE DIAGNOSIS:  .  Generalized weakness with falls/FTT adult  2. Hypokalemia-    3. Dehydration/CHRISTIAN-  4. CAD with stents/Chr Sys CHF/Pafib with Biv PPM and ablation       MEDICATIONS:  It is important that you take the medication exactly as they are prescribed. Keep your medication in the bottles provided by the pharmacist and keep a list of the medication names, dosages, and times to be taken in your wallet. Do not take other medications without consulting your doctor. Pain Management: per above medications    What to do at [address]:  Cardiac Diet    Recommended activity: Activity as tolerated    If you have questions regarding the hospital related prescriptions or hospital related issues please call [facility] at . You can always direct your questions to your primary care doctor if you are unable to reach your hospital physician; your PCP works as an extension of your hospital doctor just like your hospital doctor is an extension of your PCP for your time at the hospital [facility]).     If you experience any of the following symptoms then please call your primary care physician or return to the emergency room if you cannot get hold of your doctor:  Fever, chills, nausea, vomiting, diarrhea, change in mentation, falling, bleeding, shortness of breath

## 2022-09-26 NOTE — PROGRESS NOTES
Transition of Care Plan:     RUR: 11%  Disposition: Home with [address] has accepted    Follow up appointments: PCP & specialists as indicated  DME needed: None - has grab bars, RW, cane, rollator, dressing aids  Transportation at Discharge: Son to  provide transportation  [address] or means to access home: Yes      IM Medicare Letter: 2nd IM Letter given, Signed copy placed on pt chart. Is patient a [place] and connected with the [place]? N/a              If yes, was [employer] transfer form completed and VA notified? N/a  Caregiver Contact: Gloria Carias, son ([phone])  Discharge Caregiver contacted prior to discharge? Yes  Care Conference needed?: No    Pt cleared for d/c from CM standpoint. CM discussed d/c plan with Drew Whitten via phone. He agreed    No questions or concerns at this time. Per Son, Cardiologist, Dr. Soheila Bustamante office will contact them with an appointment. CM attempted to make PCP appointment - the office will contact pt. Care Management Interventions  PCP Verified by CM: Yes  Palliative Care Criteria Met (RRAT>21 & CHF Dx)?: No  Mode of Transport at Discharge: Other (see comment)  Transition of Care Consult (CM Consult): Discharge Planning, Home Health ([address])  Discharge Durable Medical Equipment: No (grab bars, cane, dressing aids, RW, rollator at home)  Health Maintenance Reviewed: Yes  Physical Therapy Consult: Yes  Occupational Therapy Consult: Yes  Speech Therapy Consult: No  Support Systems: Child(johnny), Other Family Member(s)  Confirm Follow Up Transport: Family  The Plan for Transition of Care is Related to the Following Treatment Goals : HH PT/OT  The Patient and/or Patient Representative was Provided with a Choice of Provider and Agrees with the Discharge Plan?: Yes  Name of the Patient Representative Who was Provided with a Choice of Provider and Agrees with the Discharge Plan:  Son  Discharge Location  Patient Expects to be Discharged to[de-identified] Home with home health      [address]  Phone: [phone]

## 2022-09-26 NOTE — PROGRESS NOTES
0730 Bedside shift change report given to [address]ard (oncoming nurse) by Kendra/Nettie RN (offgoing nurse). Report included the following information SBAR, Kardex, ED Summary, MAR, Recent Results, and Cardiac Rhythm V Paced .

## 2022-09-26 NOTE — PROGRESS NOTES
End of Shift Note    Bedside shift change report given to  [address] Heard (oncoming nurse) by Jose R Zapata, BREANNA (offgoing nurse). Report included the following information SBAR, Kardex, MAR, and Recent Results    Shift worked:  7p-7a     Shift summary and any significant changes:     Pt slept throughout the night. No AM labs. Plans for discharge today with home health. Concerns for physician to address:       Zone phone for oncoming shift:          Activity:  Activity Level: Up with Assistance  Number times ambulated in hallways past shift: 0  Number of times OOB to chair past shift: 0    Cardiac:   Cardiac Monitoring: Yes      Cardiac Rhythm: Ventricular Paced    Access:  Current line(s): PIV     Genitourinary:   Urinary status: voiding    Respiratory:   O2 Device: None (Room air)  Chronic home O2 use?: NO  Incentive spirometer at bedside: NO       GI:  Last Bowel Movement Date: 09/25/22  Current diet:  ADULT DIET Easy to Chew  Passing flatus: YES  Tolerating current diet: YES       Pain Management:   Patient states pain is manageable on current regimen: YES    Skin:  Gavin Score: 19  Interventions: increase time out of bed, PT/OT consult, and limit briefs    Patient Safety:  Fall Score:  Total Score: 4  Interventions: bed/chair alarm, assistive device (walker, cane, etc), gripper socks, pt to call before getting OOB, and stay with me (per policy)  High Fall Risk: Yes    Length of Stay:  Expected LOS: 2d 14h  Actual LOS: 4      Jose R Zapata, RN

## 2022-09-26 NOTE — PROGRESS NOTES
Problem: Pressure Injury - Risk of  Goal: *Prevention of pressure injury  Description: Document Gavin Scale and appropriate interventions in the flowsheet. Outcome: Progressing Towards Goal  Note: Pressure Injury Interventions:  Sensory Interventions: Assess changes in LOC, Avoid rigorous massage over bony prominences, Assess need for specialty bed, Keep linens dry and wrinkle-free, Minimize linen layers    Moisture Interventions: Absorbent underpads, Apply protective barrier, creams and emollients, Assess need for specialty bed, Limit adult briefs, Moisture barrier    Activity Interventions: Assess need for specialty bed, Increase time out of bed, Pressure redistribution bed/mattress(bed type), PT/OT evaluation    Mobility Interventions: Assess need for specialty bed, HOB 30 degrees or less, Pressure redistribution bed/mattress (bed type), PT/OT evaluation    Nutrition Interventions: Document food/fluid/supplement intake    Friction and Shear Interventions: Apply protective barrier, creams and emollients, Feet elevated on foot rest, HOB 30 degrees or less, Lift sheet                Problem: Patient Education: Go to Patient Education Activity  Goal: Patient/Family Education  Outcome: Progressing Towards Goal     Problem: Patient Education: Go to Patient Education Activity  Goal: Patient/Family Education  Outcome: Progressing Towards Goal     Problem: Patient Education: Go to Patient Education Activity  Goal: Patient/Family Education  Outcome: Progressing Towards Goal     Problem: Falls - Risk of  Goal: *Absence of Falls  Description: Document Paco Fall Risk and appropriate interventions in the flowsheet.   Outcome: Progressing Towards Goal  Note: Fall Risk Interventions:  Mobility Interventions: Bed/chair exit alarm, Patient to call before getting OOB, PT Consult for mobility concerns, PT Consult for assist device competence, Strengthening exercises (ROM-active/passive), Utilize walker, cane, or other assistive device    Mentation Interventions: Bed/chair exit alarm, Increase mobility, More frequent rounding, Reorient patient, Room close to nurse's station    Medication Interventions: Bed/chair exit alarm, Patient to call before getting OOB, Teach patient to arise slowly    Elimination Interventions: Call light in reach, Stay With Me (per policy), Patient to call for help with toileting needs, Toilet paper/wipes in reach, Toileting schedule/hourly rounds    History of Falls Interventions: Bed/chair exit alarm, Door open when patient unattended, Investigate reason for fall, Room close to nurse's station, Utilize gait belt for transfer/ambulation         Problem: Patient Education: Go to Patient Education Activity  Goal: Patient/Family Education  Outcome: Progressing Towards Goal     Problem: General Infection Care Plan (Adult and Pediatric)  Goal: Improvement in signs and symptoms of infection  Outcome: Progressing Towards Goal  Goal: *Optimize nutritional status  Outcome: Progressing Towards Goal     Problem: Patient Education: Go to Patient Education Activity  Goal: Patient/Family Education  Outcome: Progressing Towards Goal     Problem: Syncope  Goal: *Absence of injury  Outcome: Progressing Towards Goal  Goal: Decrease or eliminate episodes of syncope  Outcome: Progressing Towards Goal     Problem: Patient Education: Go to Patient Education Activity  Goal: Patient/Family Education  Outcome: Progressing Towards Goal

## 2022-09-26 NOTE — PROGRESS NOTES
DISCHARGE SUMMARY FROM [facility] NURSE    The patient is stable for discharge. I have reviewed the discharge instructions with the patient and Son . The patient and Son verbalized understanding. All questions were fully answered. The patient and Son  verbalized no complaints. Hard scripts and medication handouts were given and reviewed with the patient and Son . Appropriate educational materials and medication side effects teaching were also provided. Cardiac monitor and IV line(s) were removed. The following personal items collected during admission were returned to the patient/family  Home medications: None  Dental Appliance: Dental Appliances: None  Vision: Visual Aid: Glasses, At home  Hearing Aid: Hearing Aid: Bilateral, With patient  Jewelry: Jewelry: None  Clothing: Clothing: Pajamas  Other Valuables: Other Valuables: None  Valuables sent to safe: There were no personal belongings, valuables or home medications left at patient's bedside,  or safe.

## 2022-09-26 NOTE — PROGRESS NOTES
Problem: Pressure Injury - Risk of  Goal: *Prevention of pressure injury  Description: Document Gavin Scale and appropriate interventions in the flowsheet. Outcome: Resolved/Met     Problem: Patient Education: Go to Patient Education Activity  Goal: Patient/Family Education  Outcome: Resolved/Met     Problem: Patient Education: Go to Patient Education Activity  Goal: Patient/Family Education  Outcome: Resolved/Met     Problem: Patient Education: Go to Patient Education Activity  Goal: Patient/Family Education  Outcome: Resolved/Met     Problem: Falls - Risk of  Goal: *Absence of Falls  Description: Document Paco Fall Risk and appropriate interventions in the flowsheet.   Outcome: Resolved/Met     Problem: Patient Education: Go to Patient Education Activity  Goal: Patient/Family Education  Outcome: Resolved/Met     Problem: General Infection Care Plan (Adult and Pediatric)  Goal: Improvement in signs and symptoms of infection  Outcome: Resolved/Met  Goal: *Optimize nutritional status  Outcome: Resolved/Met     Problem: Patient Education: Go to Patient Education Activity  Goal: Patient/Family Education  Outcome: Resolved/Met     Problem: Syncope  Goal: *Absence of injury  Outcome: Resolved/Met  Goal: Decrease or eliminate episodes of syncope  Outcome: Resolved/Met     Problem: Patient Education: Go to Patient Education Activity  Goal: Patient/Family Education  Outcome: Resolved/Met

## 2022-09-26 NOTE — PROGRESS NOTES
Cardiology Progress Note      9/26/2022    Admit Date: 9/21/2022          Subjective:  No syncope, dizziness, palpitations, chest pain, worsening orthopnea, PND, or edema. Visit Vitals  /69   Pulse 82   Temp 97.8 °F (36.6 °C)   Resp 18   Ht 4' 9\" (1.448 m)   Wt 58.5 kg (128 lb 15.5 oz)   SpO2 97%   BMI 27.91 kg/m²     09/24 1901 - 09/26 0700  In: 200 [P.O.:200]  Out: 725 [Urine:725]        Objective:      Physical Exam:  VS as above  Chest CTA  Card RRR 2/6 LING no gallop  Neuro awake and alert     Data Review:   Labs:  No results found for this or any previous visit (from the past 24 hour(s)). Telemetry: V pacing with underlying afib R 80. Two episodes of \"asystole\" last night were not that--I personally reviewed the tracings. Assessment:     Recurrent falls leading to admission. She has fallen in the past (non-syncopal). Transient hypotension noted here leading to stopping her Bblocker and ACEI. Possible dehydration and orthostatic hypotension on admission. 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 Battery near end of life. Ischemic heart disease with remote LAD stent placement in 11/2007. No angina. Nuclear stress test without ischemia in 2014. Chronic systolic CHF. EF 45-50% here, EF was around 35% remotely. Chronic anticoagulation with Xarelto. Mod- severe mitral regurgitation. Full code. Plan:   No changes to her current medical regimen. Keep beta-blocker and ACEI OFF. Could be discharged from a cardiac standpoint. Needs early F/U with her regular EP Dr Selwyn Lira to arrange gen change. I spoke to her and her son Malorie Hernandez) today about this. He wanted me to make the appointment, so I provided her and her son's information to my  to make the appointment with Dr. Selwyn Lira in 2 weeks.      Signed By: Loli Leon MD     September 26, 2022

## 2022-09-26 NOTE — PROGRESS NOTES
Physician Progress Note      PATIENT:               Parmjit Kidd  CSN #:                  [id number]  :                       1/15/1931  ADMIT DATE:       2022 9:13 AM  DISCH DATE:        2022 1:50 PM  RESPONDING  PROVIDER #:        Tammy Barber MD          QUERY TEXT:    Pt admitted with generalized weakness w/ falls/FTT adult likely from low BP. Pt noted to have PPM end of life battery with EF of 45 - 50%, mod-severe MR on echo. If possible, please document in progress notes and discharge summary the relationship, if any, between hypotension and PPM interrogation findings. The medical record reflects the following:  Risk Factors: 89yoF w/ CAD, chronic systolic CHF, chronic BIV PM of 2009  Clinical Indicators: borderline hypotensive with systolic of 92, ((!) 78/57),  creatinine 1.44 (2018) BUn 53   EP/CArdiology consult;  Transient hypotension noted here. Possible dehydration and orthostatic hypotension, though does not endorse lightheadedness or dizziness   battery is estimated at   2 months (<1 to 5 month range) at this point and she is pacemaker-dependent    Treatment: PPM interrogation. IVF, midodrine, Cardiology consult. and  urgent follow up w/regular EP physician. Thank you,  Lxea Segundo RN, CDI  Options provided:  -- Hypotension due to PPM end of life battery  -- Hypotension unrelated to PPM end of life battery  -- Hypotension due to other ###, #.  -- Other - I will add my own diagnosis  -- Disagree - Not applicable / Not valid  -- Disagree - Clinically unable to determine / Unknown  -- Refer to Clinical Documentation Reviewer    PROVIDER RESPONSE TEXT:    This patient has hypotension, unrelated to PPM end of life battery.     Query created by: Anil Kelly on 2022 10:16 AM      Electronically signed by:  Tammy Barber MD 2022 4:52 PM

## 2022-10-03 NOTE — PROGRESS NOTES
Physician Progress Note      PATIENT:               Blanche Guzman  CSN #:                  [id number]  :                       1/15/1931  ADMIT DATE:       2022 9:13 AM  DISCH DATE:        2022 1:50 PM  RESPONDING  PROVIDER #:        Sulma Benton MD          QUERY TEXT:    Patient admitted with weakness, failure to thrive and multiple falls suspected to be related to hypotension. Midodrine PRN was recommended but not given this admission. EP consult noted  \"Transient hypotension noted here leading to stopping her Bblocker and ACEI. Possible dehydration and orthostatic hypotension on admission. \"  After study, can the etiology of the hypotension be further specified as: The medical record reflects the following:  Risk Factors: 89yoF w/ ischemic heart disease, chronic systolic HF, mitral regurgitation  Clinical Indicators:  borderline hypotensive with systolic of 92, ((!) 60/47),  creatinine 1.44 (BL ) BUn 77, remained elevated @61 on discharge. EP progress notes () stated 'Transient hypotension noted here leading to stopping her Bblocker and ACEI. Possible dehydration and orthostatic hypotension on admission.'  Treatment: IVF, discontinued B Blocker and ACEI. Thank you,  Idalia Loyd RN, CDI  Options provided:  -- Orthostatic Hypotension due to Dehydration  -- Orthostatic Hypotension due to medication  -- Orthostatic hypotension due to other, #.  -- Other - I will add my own diagnosis  -- Disagree - Not applicable / Not valid  -- Disagree - Clinically unable to determine / Unknown  -- Refer to Clinical Documentation Reviewer    PROVIDER RESPONSE TEXT:    This patient has orthostatic hypotension due to dehydration POA.     Query created by: Kari Babb on 2022 10:45 AM      Electronically signed by:  Sulma Benton MD 10/3/2022 2:39 PM

## 2023-04-05 ENCOUNTER — APPOINTMENT (OUTPATIENT)
Dept: GENERAL RADIOLOGY | Age: 88
End: 2023-04-05
Attending: EMERGENCY MEDICINE
Payer: MEDICARE

## 2023-04-05 ENCOUNTER — HOSPITAL ENCOUNTER (OUTPATIENT)
Age: 88
LOS: 2 days | End: 2023-04-05
Attending: EMERGENCY MEDICINE
Payer: MEDICARE

## 2023-04-05 ENCOUNTER — ANESTHESIA (OUTPATIENT)
Dept: INTERVENTIONAL RADIOLOGY/VASCULAR | Age: 88
End: 2023-04-05
Payer: MEDICARE

## 2023-04-05 ENCOUNTER — ANESTHESIA EVENT (OUTPATIENT)
Dept: INTERVENTIONAL RADIOLOGY/VASCULAR | Age: 88
End: 2023-04-05
Payer: MEDICARE

## 2023-04-05 ENCOUNTER — APPOINTMENT (OUTPATIENT)
Dept: INTERVENTIONAL RADIOLOGY/VASCULAR | Age: 88
End: 2023-04-05
Attending: EMERGENCY MEDICINE
Payer: MEDICARE

## 2023-04-05 ENCOUNTER — APPOINTMENT (OUTPATIENT)
Dept: CT IMAGING | Age: 88
End: 2023-04-05
Attending: EMERGENCY MEDICINE
Payer: MEDICARE

## 2023-04-05 PROBLEM — R57.8 HEMORRHAGIC SHOCK (HCC): Status: ACTIVE | Noted: 2023-01-01

## 2023-04-05 PROBLEM — R57.8 HEMORRHAGIC SHOCK (HCC): Status: ACTIVE | Noted: 2023-04-05

## 2023-04-05 LAB
ABO + RH BLD: NORMAL
ALBUMIN SERPL-MCNC: 2.1 G/DL (ref 3.5–5)
ALBUMIN SERPL-MCNC: 2.4 G/DL (ref 3.5–5)
ALBUMIN/GLOB SERPL: 0.7 (ref 1.1–2.2)
ALBUMIN/GLOB SERPL: 0.7 (ref 1.1–2.2)
ALP SERPL-CCNC: 113 U/L (ref 45–117)
ALP SERPL-CCNC: 94 U/L (ref 45–117)
ALT SERPL-CCNC: 64 U/L (ref 12–78)
ALT SERPL-CCNC: 69 U/L (ref 12–78)
ANION GAP SERPL CALC-SCNC: 10 MMOL/L (ref 5–15)
ANION GAP SERPL CALC-SCNC: 13 MMOL/L (ref 5–15)
AST SERPL-CCNC: 115 U/L (ref 15–37)
AST SERPL-CCNC: 148 U/L (ref 15–37)
ATRIAL RATE: 80 BPM
BASOPHILS # BLD: 0 K/UL (ref 0–0.1)
BASOPHILS NFR BLD: 0 % (ref 0–1)
BILIRUB SERPL-MCNC: 0.6 MG/DL (ref 0.2–1)
BILIRUB SERPL-MCNC: 1.8 MG/DL (ref 0.2–1)
BLD PROD TYP BPU: NORMAL
BLOOD GROUP ANTIBODIES SERPL: NORMAL
BNP SERPL-MCNC: 2183 PG/ML
BPU ID: NORMAL
BUN SERPL-MCNC: 76 MG/DL (ref 6–20)
BUN SERPL-MCNC: 88 MG/DL (ref 6–20)
BUN/CREAT SERPL: 47 (ref 12–20)
BUN/CREAT SERPL: 55 (ref 12–20)
CALCIUM SERPL-MCNC: 7.1 MG/DL (ref 8.5–10.1)
CALCIUM SERPL-MCNC: 8.4 MG/DL (ref 8.5–10.1)
CALCULATED R AXIS, ECG10: -57 DEGREES
CALCULATED T AXIS, ECG11: 100 DEGREES
CHLORIDE SERPL-SCNC: 106 MMOL/L (ref 97–108)
CHLORIDE SERPL-SCNC: 96 MMOL/L (ref 97–108)
CO2 SERPL-SCNC: 18 MMOL/L (ref 21–32)
CO2 SERPL-SCNC: 22 MMOL/L (ref 21–32)
COMMENT, HOLDF: NORMAL
CREAT SERPL-MCNC: 1.37 MG/DL (ref 0.55–1.02)
CREAT SERPL-MCNC: 1.87 MG/DL (ref 0.55–1.02)
CROSSMATCH RESULT,%XM: NORMAL
DIAGNOSIS, 93000: NORMAL
DIFFERENTIAL METHOD BLD: ABNORMAL
EOSINOPHIL # BLD: 0 K/UL (ref 0–0.4)
EOSINOPHIL NFR BLD: 0 % (ref 0–7)
ERYTHROCYTE [DISTWIDTH] IN BLOOD BY AUTOMATED COUNT: 17.6 % (ref 11.5–14.5)
ERYTHROCYTE [DISTWIDTH] IN BLOOD BY AUTOMATED COUNT: 20.4 % (ref 11.5–14.5)
FIBRINOGEN PPP-MCNC: 249 MG/DL (ref 200–475)
GLOBULIN SER CALC-MCNC: 2.9 G/DL (ref 2–4)
GLOBULIN SER CALC-MCNC: 3.4 G/DL (ref 2–4)
GLUCOSE SERPL-MCNC: 161 MG/DL (ref 65–100)
GLUCOSE SERPL-MCNC: 256 MG/DL (ref 65–100)
HCT VFR BLD AUTO: 15.4 % (ref 35–47)
HCT VFR BLD AUTO: 33.7 % (ref 35–47)
HEMOCCULT STL QL: POSITIVE
HGB BLD-MCNC: 11.1 G/DL (ref 11.5–16)
HGB BLD-MCNC: 11.8 G/DL (ref 11.5–16)
HGB BLD-MCNC: 4.6 G/DL (ref 11.5–16)
HISTORY CHECKED?,CKHIST: NORMAL
HISTORY CHECKED?,CKHIST: NORMAL
IMM GRANULOCYTES # BLD AUTO: 0.1 K/UL (ref 0–0.04)
IMM GRANULOCYTES NFR BLD AUTO: 1 % (ref 0–0.5)
INR PPP: 2.9 (ref 0.9–1.1)
LYMPHOCYTES # BLD: 0.5 K/UL (ref 0.8–3.5)
LYMPHOCYTES NFR BLD: 6 % (ref 12–49)
MAGNESIUM SERPL-MCNC: 2.2 MG/DL (ref 1.6–2.4)
MCH RBC QN AUTO: 21.3 PG (ref 26–34)
MCH RBC QN AUTO: 27.4 PG (ref 26–34)
MCHC RBC AUTO-ENTMCNC: 29.9 G/DL (ref 30–36.5)
MCHC RBC AUTO-ENTMCNC: 32.9 G/DL (ref 30–36.5)
MCV RBC AUTO: 71.3 FL (ref 80–99)
MCV RBC AUTO: 83.2 FL (ref 80–99)
MONOCYTES # BLD: 0.6 K/UL (ref 0–1)
MONOCYTES NFR BLD: 7 % (ref 5–13)
NEUTS SEG # BLD: 7.1 K/UL (ref 1.8–8)
NEUTS SEG NFR BLD: 86 % (ref 32–75)
NRBC # BLD: 0.34 K/UL (ref 0–0.01)
NRBC # BLD: 0.44 K/UL (ref 0–0.01)
NRBC BLD-RTO: 4.1 PER 100 WBC
NRBC BLD-RTO: 4.4 PER 100 WBC
PATH REV BLD -IMP: NORMAL
PHOSPHATE SERPL-MCNC: 3.6 MG/DL (ref 2.6–4.7)
PLATELET # BLD AUTO: 140 K/UL (ref 150–400)
PLATELET # BLD AUTO: 235 K/UL (ref 150–400)
PMV BLD AUTO: 11 FL (ref 8.9–12.9)
PMV BLD AUTO: 11.8 FL (ref 8.9–12.9)
POTASSIUM SERPL-SCNC: 4.2 MMOL/L (ref 3.5–5.1)
POTASSIUM SERPL-SCNC: 4.3 MMOL/L (ref 3.5–5.1)
PROT SERPL-MCNC: 5 G/DL (ref 6.4–8.2)
PROT SERPL-MCNC: 5.8 G/DL (ref 6.4–8.2)
PROTHROMBIN TIME: 28 SEC (ref 9–11.1)
Q-T INTERVAL, ECG07: 480 MS
QRS DURATION, ECG06: 142 MS
QTC CALCULATION (BEZET), ECG08: 557 MS
RBC # BLD AUTO: 2.16 M/UL (ref 3.8–5.2)
RBC # BLD AUTO: 4.05 M/UL (ref 3.8–5.2)
RBC MORPH BLD: ABNORMAL
SAMPLES BEING HELD,HOLD: NORMAL
SODIUM SERPL-SCNC: 131 MMOL/L (ref 136–145)
SODIUM SERPL-SCNC: 134 MMOL/L (ref 136–145)
SPECIMEN EXP DATE BLD: NORMAL
STATUS OF UNIT,%ST: NORMAL
TROPONIN I SERPL HS-MCNC: 87 NG/L (ref 0–51)
UNIT DIVISION, %UDIV: 0
VENTRICULAR RATE, ECG03: 81 BPM
WBC # BLD AUTO: 8.3 K/UL (ref 3.6–11)
WBC # BLD AUTO: 9.9 K/UL (ref 3.6–11)

## 2023-04-05 PROCEDURE — 77010033678 HC OXYGEN DAILY

## 2023-04-05 PROCEDURE — 83880 ASSAY OF NATRIURETIC PEPTIDE: CPT

## 2023-04-05 PROCEDURE — 74011000636 HC RX REV CODE- 636: Performed by: EMERGENCY MEDICINE

## 2023-04-05 PROCEDURE — 83735 ASSAY OF MAGNESIUM: CPT

## 2023-04-05 PROCEDURE — 74011000250 HC RX REV CODE- 250: Performed by: HOSPITALIST

## 2023-04-05 PROCEDURE — C1760 CLOSURE DEV, VASC: HCPCS

## 2023-04-05 PROCEDURE — 74011250636 HC RX REV CODE- 250/636: Performed by: NURSE PRACTITIONER

## 2023-04-05 PROCEDURE — 71045 X-RAY EXAM CHEST 1 VIEW: CPT

## 2023-04-05 PROCEDURE — 74011000250 HC RX REV CODE- 250: Performed by: STUDENT IN AN ORGANIZED HEALTH CARE EDUCATION/TRAINING PROGRAM

## 2023-04-05 PROCEDURE — 74011000250 HC RX REV CODE- 250: Performed by: EMERGENCY MEDICINE

## 2023-04-05 PROCEDURE — 85018 HEMOGLOBIN: CPT

## 2023-04-05 PROCEDURE — C1769 GUIDE WIRE: HCPCS

## 2023-04-05 PROCEDURE — 65610000006 HC RM INTENSIVE CARE

## 2023-04-05 PROCEDURE — C1892 INTRO/SHEATH,FIXED,PEEL-AWAY: HCPCS

## 2023-04-05 PROCEDURE — 82272 OCCULT BLD FECES 1-3 TESTS: CPT

## 2023-04-05 PROCEDURE — 74178 CT ABD&PLV WO CNTR FLWD CNTR: CPT

## 2023-04-05 PROCEDURE — 77030008684 HC TU ET CUF COVD -B: Performed by: STUDENT IN AN ORGANIZED HEALTH CARE EDUCATION/TRAINING PROGRAM

## 2023-04-05 PROCEDURE — 99285 EMERGENCY DEPT VISIT HI MDM: CPT

## 2023-04-05 PROCEDURE — 77030026438 HC STYL ET INTUB CARD -A: Performed by: STUDENT IN AN ORGANIZED HEALTH CARE EDUCATION/TRAINING PROGRAM

## 2023-04-05 PROCEDURE — 86923 COMPATIBILITY TEST ELECTRIC: CPT

## 2023-04-05 PROCEDURE — 85384 FIBRINOGEN ACTIVITY: CPT

## 2023-04-05 PROCEDURE — 80053 COMPREHEN METABOLIC PANEL: CPT

## 2023-04-05 PROCEDURE — 84100 ASSAY OF PHOSPHORUS: CPT

## 2023-04-05 PROCEDURE — 84484 ASSAY OF TROPONIN QUANT: CPT

## 2023-04-05 PROCEDURE — 94002 VENT MGMT INPAT INIT DAY: CPT

## 2023-04-05 PROCEDURE — 74011000258 HC RX REV CODE- 258: Performed by: EMERGENCY MEDICINE

## 2023-04-05 PROCEDURE — C1887 CATHETER, GUIDING: HCPCS

## 2023-04-05 PROCEDURE — C1894 INTRO/SHEATH, NON-LASER: HCPCS

## 2023-04-05 PROCEDURE — 77030009378 HC DEV TORQ OLCT F/GWIRE MANIP COOK -A

## 2023-04-05 PROCEDURE — 36415 COLL VENOUS BLD VENIPUNCTURE: CPT

## 2023-04-05 PROCEDURE — 74011000636 HC RX REV CODE- 636: Performed by: STUDENT IN AN ORGANIZED HEALTH CARE EDUCATION/TRAINING PROGRAM

## 2023-04-05 PROCEDURE — P9016 RBC LEUKOCYTES REDUCED: HCPCS

## 2023-04-05 PROCEDURE — 93005 ELECTROCARDIOGRAM TRACING: CPT

## 2023-04-05 PROCEDURE — 74011250636 HC RX REV CODE- 250/636: Performed by: NURSE ANESTHETIST, CERTIFIED REGISTERED

## 2023-04-05 PROCEDURE — 85610 PROTHROMBIN TIME: CPT

## 2023-04-05 PROCEDURE — 96361 HYDRATE IV INFUSION ADD-ON: CPT

## 2023-04-05 PROCEDURE — 96374 THER/PROPH/DIAG INJ IV PUSH: CPT

## 2023-04-05 PROCEDURE — 2709999900 HC NON-CHARGEABLE SUPPLY

## 2023-04-05 PROCEDURE — 74011250636 HC RX REV CODE- 250/636: Performed by: EMERGENCY MEDICINE

## 2023-04-05 PROCEDURE — 77030019698 HC SYR ANGI MDLON MRTM -A

## 2023-04-05 PROCEDURE — 74011250636 HC RX REV CODE- 250/636: Performed by: STUDENT IN AN ORGANIZED HEALTH CARE EDUCATION/TRAINING PROGRAM

## 2023-04-05 PROCEDURE — 85027 COMPLETE CBC AUTOMATED: CPT

## 2023-04-05 PROCEDURE — 36245 INS CATH ABD/L-EXT ART 1ST: CPT

## 2023-04-05 PROCEDURE — 77030010324 HC TBNG CNTRST MRTM -A

## 2023-04-05 PROCEDURE — 85025 COMPLETE CBC W/AUTO DIFF WBC: CPT

## 2023-04-05 PROCEDURE — 36430 TRANSFUSION BLD/BLD COMPNT: CPT

## 2023-04-05 PROCEDURE — 77030014021 HC SYR ANGI FIX LOK MRTM -A

## 2023-04-05 PROCEDURE — 86900 BLOOD TYPING SEROLOGIC ABO: CPT

## 2023-04-05 PROCEDURE — 74011000250 HC RX REV CODE- 250: Performed by: NURSE ANESTHETIST, CERTIFIED REGISTERED

## 2023-04-05 RX ORDER — DEXAMETHASONE SODIUM PHOSPHATE 4 MG/ML
INJECTION, SOLUTION INTRA-ARTICULAR; INTRALESIONAL; INTRAMUSCULAR; INTRAVENOUS; SOFT TISSUE AS NEEDED
Status: DISCONTINUED
Start: 2023-04-05 | End: 2023-04-05 | Stop reason: HOSPADM

## 2023-04-05 RX ORDER — SODIUM CHLORIDE 9 MG/ML
INJECTION, SOLUTION INTRAVENOUS
Status: DISCONTINUED
Start: 2023-04-05 | End: 2023-04-05 | Stop reason: HOSPADM

## 2023-04-05 RX ORDER — PHENYLEPHRINE HCL IN 0.9% NACL 0.4MG/10ML
SYRINGE (ML) INTRAVENOUS AS NEEDED
Status: DISCONTINUED
Start: 2023-04-05 | End: 2023-04-05 | Stop reason: HOSPADM

## 2023-04-05 RX ORDER — SODIUM CHLORIDE, SODIUM LACTATE, POTASSIUM CHLORIDE, CALCIUM CHLORIDE 600; 310; 30; 20 MG/100ML; MG/100ML; MG/100ML; MG/100ML
75 INJECTION, SOLUTION INTRAVENOUS CONTINUOUS
Start: 2023-04-05 | End: 2023-04-06

## 2023-04-05 RX ORDER — SUCCINYLCHOLINE CHLORIDE 20 MG/ML
INJECTION INTRAMUSCULAR; INTRAVENOUS AS NEEDED
Status: DISCONTINUED
Start: 2023-04-05 | End: 2023-04-05 | Stop reason: HOSPADM

## 2023-04-05 RX ORDER — SODIUM CHLORIDE 0.9 % (FLUSH) 0.9 %
5-40 SYRINGE (ML) INJECTION EVERY 8 HOURS
Status: DISPENSED
Start: 2023-04-05

## 2023-04-05 RX ORDER — ONDANSETRON 2 MG/ML
4 INJECTION INTRAMUSCULAR; INTRAVENOUS
Status: ACTIVE
Start: 2023-04-05

## 2023-04-05 RX ORDER — ACETAMINOPHEN 325 MG/1
650 TABLET ORAL
Status: ACTIVE
Start: 2023-04-05

## 2023-04-05 RX ORDER — SODIUM CHLORIDE 9 MG/ML
250 INJECTION, SOLUTION INTRAVENOUS AS NEEDED
Status: DISPENSED
Start: 2023-04-05

## 2023-04-05 RX ORDER — HYDROMORPHONE HYDROCHLORIDE 1 MG/ML
0.2 INJECTION, SOLUTION INTRAMUSCULAR; INTRAVENOUS; SUBCUTANEOUS
Start: 2023-04-05

## 2023-04-05 RX ORDER — ONDANSETRON 2 MG/ML
4 INJECTION INTRAMUSCULAR; INTRAVENOUS AS NEEDED
Start: 2023-04-05

## 2023-04-05 RX ORDER — LIDOCAINE HYDROCHLORIDE 10 MG/ML
0.1 INJECTION, SOLUTION EPIDURAL; INFILTRATION; INTRACAUDAL; PERINEURAL AS NEEDED
Start: 2023-04-05

## 2023-04-05 RX ORDER — ONDANSETRON 4 MG/1
4 TABLET, ORALLY DISINTEGRATING ORAL
Status: ACTIVE
Start: 2023-04-05

## 2023-04-05 RX ORDER — LIDOCAINE HYDROCHLORIDE 20 MG/ML
INJECTION, SOLUTION EPIDURAL; INFILTRATION; INTRACAUDAL; PERINEURAL AS NEEDED
Status: DISCONTINUED
Start: 2023-04-05 | End: 2023-04-05 | Stop reason: HOSPADM

## 2023-04-05 RX ORDER — ACETAMINOPHEN 650 MG/1
650 SUPPOSITORY RECTAL
Status: ACTIVE
Start: 2023-04-05

## 2023-04-05 RX ORDER — ONDANSETRON 2 MG/ML
INJECTION INTRAMUSCULAR; INTRAVENOUS AS NEEDED
Status: DISCONTINUED
Start: 2023-04-05 | End: 2023-04-05 | Stop reason: HOSPADM

## 2023-04-05 RX ORDER — FENTANYL CITRATE 50 UG/ML
25 INJECTION, SOLUTION INTRAMUSCULAR; INTRAVENOUS
Start: 2023-04-05

## 2023-04-05 RX ORDER — NOREPINEPHRINE BITARTRATE/D5W 8 MG/250ML
.5-16 PLASTIC BAG, INJECTION (ML) INTRAVENOUS
Status: DISPENSED
Start: 2023-04-05

## 2023-04-05 RX ORDER — HEPARIN SODIUM 200 [USP'U]/100ML
800 INJECTION, SOLUTION INTRAVENOUS ONCE
Status: COMPLETED
Start: 2023-04-05 | End: 2023-04-05

## 2023-04-05 RX ORDER — MORPHINE SULFATE 2 MG/ML
1 INJECTION, SOLUTION INTRAMUSCULAR; INTRAVENOUS
Status: DISPENSED
Start: 2023-04-05

## 2023-04-05 RX ORDER — LIDOCAINE HYDROCHLORIDE 20 MG/ML
18 INJECTION, SOLUTION INFILTRATION; PERINEURAL ONCE
Status: COMPLETED
Start: 2023-04-05 | End: 2023-04-05

## 2023-04-05 RX ORDER — SODIUM CHLORIDE 0.9 % (FLUSH) 0.9 %
5-40 SYRINGE (ML) INJECTION AS NEEDED
Status: DISPENSED
Start: 2023-04-05

## 2023-04-05 RX ORDER — ETOMIDATE 2 MG/ML
INJECTION INTRAVENOUS AS NEEDED
Status: DISCONTINUED
Start: 2023-04-05 | End: 2023-04-05 | Stop reason: HOSPADM

## 2023-04-05 RX ORDER — POLYETHYLENE GLYCOL 3350 17 G/17G
17 POWDER, FOR SOLUTION ORAL DAILY PRN
Status: ACTIVE
Start: 2023-04-05

## 2023-04-05 RX ADMIN — IOPAMIDOL 100 ML: 755 INJECTION, SOLUTION INTRAVENOUS at 13:14

## 2023-04-05 RX ADMIN — SODIUM CHLORIDE 500 ML: 9 INJECTION, SOLUTION INTRAVENOUS at 12:59

## 2023-04-05 RX ADMIN — SUCCINYLCHOLINE CHLORIDE 140 MG: 20 INJECTION, SOLUTION INTRAMUSCULAR; INTRAVENOUS at 15:59

## 2023-04-05 RX ADMIN — NOREPINEPHRINE BITARTRATE 4 MCG/MIN: 1 INJECTION, SOLUTION, CONCENTRATE INTRAVENOUS at 14:28

## 2023-04-05 RX ADMIN — SODIUM CHLORIDE 40 MCG/MIN: 900 INJECTION, SOLUTION INTRAVENOUS at 16:02

## 2023-04-05 RX ADMIN — HEPARIN SODIUM IN SODIUM CHLORIDE 100 ML: 200 INJECTION INTRAVENOUS at 16:59

## 2023-04-05 RX ADMIN — SODIUM CHLORIDE, PRESERVATIVE FREE 10 ML: 5 INJECTION INTRAVENOUS at 18:03

## 2023-04-05 RX ADMIN — SODIUM CHLORIDE: 0.9 INJECTION, SOLUTION INTRAVENOUS at 15:56

## 2023-04-05 RX ADMIN — Medication 320 MCG: at 16:04

## 2023-04-05 RX ADMIN — SODIUM CHLORIDE, PRESERVATIVE FREE 40 ML: 5 INJECTION INTRAVENOUS at 22:52

## 2023-04-05 RX ADMIN — DEXAMETHASONE SODIUM PHOSPHATE 10 MG: 4 INJECTION, SOLUTION INTRAMUSCULAR; INTRAVENOUS at 16:05

## 2023-04-05 RX ADMIN — SODIUM CHLORIDE 500 ML: 9 INJECTION, SOLUTION INTRAVENOUS at 13:00

## 2023-04-05 RX ADMIN — IOPAMIDOL 80 ML: 755 INJECTION, SOLUTION INTRAVENOUS at 16:59

## 2023-04-05 RX ADMIN — SODIUM CHLORIDE 500 ML: 9 INJECTION, SOLUTION INTRAVENOUS at 12:26

## 2023-04-05 RX ADMIN — PROTHROMBIN, COAGULATION FACTOR VII HUMAN, COAGULATION FACTOR IX HUMAN, COAGULATION FACTOR X HUMAN, PROTEIN C, PROTEIN S HUMAN, AND WATER 3318 INT'L UNITS: KIT at 13:31

## 2023-04-05 RX ADMIN — Medication 400 MCG: at 15:59

## 2023-04-05 RX ADMIN — ETOMIDATE 10 MCG: 2 INJECTION, SOLUTION INTRAVENOUS at 15:58

## 2023-04-05 RX ADMIN — LIDOCAINE HYDROCHLORIDE 6 ML: 20 INJECTION, SOLUTION INFILTRATION; PERINEURAL at 16:59

## 2023-04-05 RX ADMIN — LIDOCAINE HYDROCHLORIDE 80 MG: 20 INJECTION, SOLUTION INTRAVENOUS at 15:58

## 2023-04-05 RX ADMIN — MORPHINE SULFATE 1 MG: 2 INJECTION, SOLUTION INTRAMUSCULAR; INTRAVENOUS at 22:32

## 2023-04-05 RX ADMIN — ONDANSETRON HYDROCHLORIDE 4 MG: 2 INJECTION, SOLUTION INTRAMUSCULAR; INTRAVENOUS at 16:34

## 2023-04-05 NOTE — ED PROVIDER NOTES
MRM 2 CRITICAL CARE 1  EMERGENCY DEPARTMENT ENCOUNTER       Pt Name: Prashant Mendoza  MRN: [id number]  [place] 1/15/1931  Date of evaluation: 2023  Provider: Yuliet Tristan MD   PCP: Mark Kim MD  Note Started: 11:21 AM 23     CHIEF COMPLAINT       Chief Complaint   Patient presents with    Shortness of Breath     SPO2 88% on NRB at 100%. Per EMS picked up from home. HISTORY OF PRESENT ILLNESS: 1 or more elements      History From: EMS, History limited by: Acute condition      Prashant Mendoza is a 80 y.o. female who presents with subacute fatigue and dyspnea. Per EMS the patient has been subacutely tired and short of breath for the past week with worsening today, patient described feeling like she is drowning. They arrived they found her 88% on room air initially hypotensive 72/48 with cool extremities. Prehospital EKG read MI though they did not note any ST elevation. She was placed on nonrebreather with improved O2 to 100% though it occasionally did drop in route. Blood pressure also waxed and waned up to 140/80, they are unable to obtain prehospital access. Nursing Notes were all reviewed and agreed with or any disagreements were addressed in the HPI. REVIEW OF SYSTEMS        Positives and Pertinent negatives as per HPI. PAST HISTORY     Past Medical History:  Past Medical History:   Diagnosis Date    BBB (bundle branch block)     Heart failure ([facility] [address]     followed by Dr Rebecca Mosqueda       Past Surgical History:  Past Surgical History:   Procedure Laterality Date    CARDIAC SURG PROCEDURE UNLIST      stent placed     HX APPENDECTOMY      HX  SECTION      x 3    HX HEENT      both ears    HX PACEMAKER         Family History:  No family history on file. Social History:  Social History     Tobacco Use    Smoking status: Never   Substance Use Topics    Alcohol use: No    Drug use: No       Allergies:   Allergies   Allergen Reactions    Adhesive Rash Clindamycin Rash    Hydrocodone Other (comments)     \"felt loopy\"    Pcn [Penicillins] Rash       CURRENT MEDICATIONS      Current Discharge Medication List        CONTINUE these medications which have NOT CHANGED    Details   metroNIDAZOLE (FLAGYL) 250 mg tablet Take 250 mg by mouth two (2) times a day. rivaroxaban (XARELTO) 15 mg tab tablet Take 15 mg by mouth daily (with breakfast). furosemide (LASIX) 40 mg tablet Take 40 mg by mouth two (2) times a day. rosuvastatin (CRESTOR) 5 mg tablet Take 5 mg by mouth nightly. lansoprazole (PREVACID) 15 mg capsule Take  by mouth Daily (before breakfast). SCREENINGS               No data recorded         PHYSICAL EXAM      ED Triage Vitals   ED Encounter Vitals Group      BP       Pulse       Resp       Temp       Temp src       SpO2       Weight       Height         Physical Exam  Vitals and nursing note reviewed. Constitutional:       General: She is not in acute distress. Appearance: She is well-developed. She is ill-appearing. HENT:      Head: Normocephalic. Cardiovascular:      Rate and Rhythm: Normal rate and regular rhythm. Pulmonary:      Effort: Tachypnea present. Breath sounds: Rales present. Skin:     General: Skin is warm and dry. Capillary Refill: Capillary refill takes 2 to 3 seconds. Coloration: Skin is pale. Neurological:      Mental Status: She is alert.         DIAGNOSTIC RESULTS   LABS:     Recent Results (from the past 12 hour(s))   EKG, 12 LEAD, INITIAL    Collection Time: 04/05/23 11:16 AM   Result Value Ref Range    Ventricular Rate 81 BPM    Atrial Rate 80 BPM    QRS Duration 142 ms    Q-T Interval 480 ms    QTC Calculation (Bezet) 557 ms    Calculated R Axis -57 degrees    Calculated T Axis 100 degrees    Diagnosis       Ventricular-paced rhythm with occasional premature ventricular complexes  Biventricular pacemaker detected  When compared with ECG of 21-SEP-2022 10:06,  premature ventricular complexes are now present     CBC WITH AUTOMATED DIFF    Collection Time: 04/05/23 11:32 AM   Result Value Ref Range    WBC 8.3 3.6 - 11.0 K/uL    RBC 2.16 (L) 3.80 - 5.20 M/uL    HGB 4.6 (LL) 11.5 - 16.0 g/dL    HCT 15.4 (LL) 35.0 - 47.0 %    MCV 71.3 (L) 80.0 - 99.0 FL    MCH 21.3 (L) 26.0 - 34.0 PG    MCHC 29.9 (L) 30.0 - 36.5 g/dL    RDW 17.6 (H) 11.5 - 14.5 %    PLATELET 380 859 - 994 K/uL    MPV 11.8 8.9 - 12.9 FL    NRBC 4.1 (H) 0  WBC    ABSOLUTE NRBC 0.34 (H) 0.00 - 0.01 K/uL    NEUTROPHILS 86 (H) 32 - 75 %    LYMPHOCYTES 6 (L) 12 - 49 %    MONOCYTES 7 5 - 13 %    EOSINOPHILS 0 0 - 7 %    BASOPHILS 0 0 - 1 %    IMMATURE GRANULOCYTES 1 (H) 0.0 - 0.5 %    ABS. NEUTROPHILS 7.1 1.8 - 8.0 K/UL    ABS. LYMPHOCYTES 0.5 (L) 0.8 - 3.5 K/UL    ABS. MONOCYTES 0.6 0.0 - 1.0 K/UL    ABS. EOSINOPHILS 0.0 0.0 - 0.4 K/UL    ABS. BASOPHILS 0.0 0.0 - 0.1 K/UL    ABS. IMM. GRANS. 0.1 (H) 0.00 - 0.04 K/UL    DF AUTOMATED      RBC COMMENTS MICROCYTOSIS  1+        RBC COMMENTS HYPOCHROMIA  2+        RBC COMMENTS POLYCHROMASIA  1+        RBC COMMENTS OVALOCYTES  1+        RBC COMMENTS POIKILOCYTOSIS  1+        RBC COMMENTS ANISOCYTOSIS  1+       METABOLIC PANEL, COMPREHENSIVE    Collection Time: 04/05/23 11:32 AM   Result Value Ref Range    Sodium 131 (L) 136 - 145 mmol/L    Potassium 4.2 3.5 - 5.1 mmol/L    Chloride 96 (L) 97 - 108 mmol/L    CO2 22 21 - 32 mmol/L    Anion gap 13 5 - 15 mmol/L    Glucose 256 (H) 65 - 100 mg/dL    BUN 88 (H) 6 - 20 MG/DL    Creatinine 1.87 (H) 0.55 - 1.02 MG/DL    BUN/Creatinine ratio 47 (H) 12 - 20      eGFR 25 (L) >60 ml/min/1.73m2    Calcium 8.4 (L) 8.5 - 10.1 MG/DL    Bilirubin, total 0.6 0.2 - 1.0 MG/DL    ALT (SGPT) 64 12 - 78 U/L    AST (SGOT) 115 (H) 15 - 37 U/L    Alk.  phosphatase 94 45 - 117 U/L    Protein, total 5.8 (L) 6.4 - 8.2 g/dL    Albumin 2.4 (L) 3.5 - 5.0 g/dL    Globulin 3.4 2.0 - 4.0 g/dL    A-G Ratio 0.7 (L) 1.1 - 2.2     TROPONIN-HIGH SENSITIVITY Collection Time: 04/05/23 11:32 AM   Result Value Ref Range    Troponin-High Sensitivity 87 (H) 0 - 51 ng/L   NT-PRO BNP    Collection Time: 04/05/23 11:32 AM   Result Value Ref Range    NT pro-BNP 2,183 (H) <450 PG/ML   PATHOLOGIST REVIEW    Collection Time: 04/05/23 11:32 AM   Result Value Ref Range    Pathologist review        Pathologic examination results can be viewed in [facility] Chart Review under the Pathology tab. SAMPLES BEING HELD    Collection Time: 04/05/23 11:36 AM   Result Value Ref Range    SAMPLES BEING HELD BLUE     COMMENT        Add-on orders for these samples will be processed based on acceptable specimen integrity and analyte stability, which may vary by analyte. RBC, ALLOCATE    Collection Time: 04/05/23 12:00 PM   Result Value Ref Range    HISTORY CHECKED?  Historical check performed    TYPE & SCREEN    Collection Time: 04/05/23 12:37 PM   Result Value Ref Range    Crossmatch Expiration 04/08/2023,2359     ABO/Rh(D) B POSITIVE     Antibody screen NEG     Unit number [id number]     Blood component type RC LR     Unit division 00     Status of unit ISSUED     Crossmatch result Compatible     Unit number [id number]     Blood component type RC LR     Unit division 00     Status of unit ISSUED     Crossmatch result Compatible     Unit number [id number]     Blood component type RC LR     Unit division 00     Status of unit ISSUED     Crossmatch result Compatible     Unit number [id number]     Blood component type RC LR     Unit division 00     Status of unit ISSUED     Crossmatch result Compatible     Unit number [id number]     Blood component type RC LR     Unit division 00     Status of unit ISSUED     Crossmatch result Compatible     Unit number [id number]     Blood component type RC LR     Unit division 00     Status of unit ISSUED     Crossmatch result Compatible     Unit number [id number]     Blood component type RC LR     Unit division 00     Status of unit ISSUED Crossmatch result Compatible     Unit number [id number]     Blood component type RC LR     Unit division 00     Status of unit ISSUED     Crossmatch result Compatible     Unit number [id number]     Blood component type RC LR     Unit division 00     Status of unit ALLOCATED     Crossmatch result Compatible     Unit number [id number]     Blood component type RC LR     Unit division 00     Status of unit ALLOCATED     Crossmatch result Compatible     Unit number [id number]     Blood component type RC LR     Unit division 00     Status of unit ALLOCATED     Crossmatch result Compatible     Unit number [id number]     Blood component type RC LR     Unit division 00     Status of unit ALLOCATED     Crossmatch result Compatible    OCCULT BLOOD, STOOL    Collection Time: 04/05/23 12:38 PM   Result Value Ref Range    Occult blood, stool Positive (A) NEG     PLASMA, ALLOCATE    Collection Time: 04/05/23  3:00 PM   Result Value Ref Range    Unit number [id number]     Blood component type FP 24h,Thaw     Unit division 00     Status of unit ALLOCATED     Unit number [id number]     Blood component type FP 24h,Thaw2     Unit division 00     Status of unit ALLOCATED     Unit number [id number]     Blood component type FP 24h,Thaw     Unit division 00     Status of unit ALLOCATED     Unit number [id number]     Blood component type FP 24h, Thaw     Unit division 00     Status of unit ALLOCATED    RBC, ALLOCATE    Collection Time: 04/05/23  3:00 PM   Result Value Ref Range    HISTORY CHECKED?  Historical check performed    MAGNESIUM    Collection Time: 04/05/23  5:39 PM   Result Value Ref Range    Magnesium 2.2 1.6 - 2.4 mg/dL   PHOSPHORUS    Collection Time: 04/05/23  5:39 PM   Result Value Ref Range    Phosphorus 3.6 2.6 - 4.7 MG/DL   METABOLIC PANEL, COMPREHENSIVE    Collection Time: 04/05/23  5:39 PM   Result Value Ref Range    Sodium 134 (L) 136 - 145 mmol/L    Potassium 4.3 3.5 - 5.1 mmol/L Chloride 106 97 - 108 mmol/L    CO2 18 (L) 21 - 32 mmol/L    Anion gap 10 5 - 15 mmol/L    Glucose 161 (H) 65 - 100 mg/dL    BUN 76 (H) 6 - 20 MG/DL    Creatinine 1.37 (H) 0.55 - 1.02 MG/DL    BUN/Creatinine ratio 55 (H) 12 - 20      eGFR 36 (L) >60 ml/min/1.73m2    Calcium 7.1 (L) 8.5 - 10.1 MG/DL    Bilirubin, total 1.8 (H) 0.2 - 1.0 MG/DL    ALT (SGPT) 69 12 - 78 U/L    AST (SGOT) 148 (H) 15 - 37 U/L    Alk. phosphatase 113 45 - 117 U/L    Protein, total 5.0 (L) 6.4 - 8.2 g/dL    Albumin 2.1 (L) 3.5 - 5.0 g/dL    Globulin 2.9 2.0 - 4.0 g/dL    A-G Ratio 0.7 (L) 1.1 - 2.2     CBC W/O DIFF    Collection Time: 04/05/23  5:39 PM   Result Value Ref Range    WBC 9.9 3.6 - 11.0 K/uL    RBC 4.05 3.80 - 5.20 M/uL    HGB 11.1 (L) 11.5 - 16.0 g/dL    HCT 33.7 (L) 35.0 - 47.0 %    MCV 83.2 80.0 - 99.0 FL    MCH 27.4 26.0 - 34.0 PG    MCHC 32.9 30.0 - 36.5 g/dL    RDW 20.4 (H) 11.5 - 14.5 %    PLATELET 313 (L) 252 - 400 K/uL    MPV 11.0 8.9 - 12.9 FL    NRBC 4.4 (H) 0  WBC    ABSOLUTE NRBC 0.44 (H) 0.00 - 0.01 K/uL   FIBRINOGEN    Collection Time: 04/05/23  5:39 PM   Result Value Ref Range    Fibrinogen 249 200 - 475 mg/dL        EKG: If performed, independent interpretation documented below in the MDM section     RADIOLOGY:  Non-plain film images such as CT, Ultrasound and MRI are read by the radiologist. Plain radiographic images are visualized and preliminarily interpreted by the ED Provider with the findings documented in the MDM section. Interpretation per the Radiologist below, if available at the time of this note:     CT ABD PELV W WO CONT    Result Date: 4/5/2023  EXAM: CT ABD PELV W WO CONT INDICATION: GI bleed COMPARISON: None. IV CONTRAST: 100 mL of Isovue-370. ORAL CONTRAST: Not given TECHNIQUE: GI bleeding study was performed.  Multislice helical CT was performed from the diaphragm to the iliac crest prior to intravenous contrast administration and from the diaphragm to the symphysis pubis during uneventful rapid bolus intravenous contrast administration. Contiguous 5 mm axial images were reconstructed and lung and soft tissue windows were generated. Coronal and sagittal reformations were generated. CT dose reduction was achieved through use of a standardized protocol tailored for this examination and automatic exposure control for dose modulation. FINDINGS: LOWER THORAX: There is mild linear atelectasis versus scarring in the medial upper lobes. There is a left-sided pacer device. There is cardiomegaly and coronary artery atherosclerosis. LIVER: No mass. BILIARY TREE: Gallbladder is within normal limits. CBD is not dilated. SPLEEN: within normal limits. PANCREAS: No mass or ductal dilatation. ADRENALS: Unremarkable. KIDNEYS: No evidence of renal stone or hydronephrosis. Benign left renal cyst requires no follow-up, and measures 7 cm. STOMACH: Unremarkable. SMALL BOWEL: No dilatation or wall thickening. COLON: Generalized constipation pattern, with diverticulosis of the colon. Acute GI bleed in the cecum, which is in the left lower quadrant of the abdomen. APPENDIX: Nonvisualized PERITONEUM: No ascites or pneumoperitoneum. RETROPERITONEUM: Severe diffuse calcific aortic atherosclerosis without aneurysm. Stenosis of the celiac axis. REPRODUCTIVE ORGANS: Uterus is noted URINARY BLADDER: No mass or calculus. BONES: No destructive bone lesion. ABDOMINAL WALL: No mass or hernia. ADDITIONAL COMMENTS: N/A     1. Acute GI bleed in the cecum, which is located in the left lower quadrant of the abdomen. 2. Background of moderate generalized constipation. 3. Diverticulosis of the colon. No acute diverticulitis. 4. Severe atherosclerosis of the coronary arteries and aorta. XR CHEST PORT    Result Date: 4/5/2023  EXAM:  XR CHEST PORT INDICATION: Dyspnea COMPARISON: September 2022 TECHNIQUE: portable chest AP view FINDINGS: Left-sided pacer device is noted. There is chronic mild cardiomegaly.  The pulmonary vasculature is within normal limits. The lungs and pleural spaces are clear. The visualized bones and upper abdomen are age-appropriate.      No acute process on portable chest.        PROCEDURES   Unless otherwise noted below, none  Critical Care  Performed by: Immanuel Calvo MD  Authorized by: Immanuel Calvo MD     Critical care provider statement:     Critical care time (minutes):  80    Critical care time was exclusive of:  Separately billable procedures and treating other patients    Critical care was necessary to treat or prevent imminent or life-threatening deterioration of the following conditions:  Circulatory failure and shock    Critical care was time spent personally by me on the following activities:  Ordering and review of laboratory studies, ordering and review of radiographic studies, pulse oximetry, re-evaluation of patient's condition, examination of patient, evaluation of patient's response to treatment, ordering and performing treatments and interventions and discussions with consultants    Care discussed with: admitting provider       CRITICAL CARE TIME   80 minutes    EMERGENCY DEPARTMENT COURSE and DIFFERENTIAL DIAGNOSIS/MDM   Vitals:    Vitals:    04/05/23 1930 04/05/23 1945 04/05/23 2000 04/05/23 2015   BP: (!) 68/58 (!) 96/55 111/61 (!) 109/57   Pulse: 72 70 70 70   Resp: 15 18 22 16   Temp: 97 °F (36.1 °C) 97.3 °F (36.3 °C) 97.9 °F (36.6 °C) 98.2 °F (36.8 °C)   SpO2: 98% 99% 99% 97%   Weight:       Height:            Patient was given the following medications:  Medications   0.9% sodium chloride infusion 250 mL (has no administration in time range)   NOREPINephrine (LEVOPHED) 8 mg in 5% dextrose 250mL (32 mcg/mL) infusion (8 mcg/min IntraVENous Rate Change 4/5/23 2041)   sodium chloride (NS) flush 5-40 mL (10 mL IntraVENous Given 4/5/23 1803)   sodium chloride (NS) flush 5-40 mL (has no administration in time range)   acetaminophen (TYLENOL) tablet 650 mg (has no administration in time range)     Or acetaminophen (TYLENOL) suppository 650 mg (has no administration in time range)   polyethylene glycol (MIRALAX) packet 17 g (has no administration in time range)   ondansetron (ZOFRAN ODT) tablet 4 mg (has no administration in time range)     Or   ondansetron (ZOFRAN) injection 4 mg (has no administration in time range)   pantoprazole (PROTONIX) 40 mg in 0.9% sodium chloride 10 mL injection (has no administration in time range)   iopamidoL (ISOVUE-370) 370 mg iodine /mL (76 %) injection 100 mL (100 mL IntraVENous Given 4/5/23 1314)   human prothrombin complex [facility]) 4-factor [facility]) injection 3,318 Int'l Units (3,318 Int'l Units IntraVENous Given 4/5/23 1331)   sodium chloride 0.9 % bolus infusion 500 mL (0 mL IntraVENous IV Completed 4/5/23 1300)   sodium chloride 0.9 % bolus infusion 500 mL (0 mL IntraVENous IV Completed 4/5/23 1401)   heparinized saline 2 units/mL infusion 1,600 Units (100 mL Irrigation New Bag 4/5/23 1659)   lidocaine (XYLOCAINE) 20 mg/mL (2 %) injection 360 mg (6 mL SubCUTAneous Given 4/5/23 1659)   iopamidoL (ISOVUE-370) 370 mg iodine /mL (76 %) injection 300 mL (80 mL IntraarTERial Given 4/5/23 1659)       Medical Decision Making  DDx heart failure, ACS, PE, symptomatic anemia    She has warm extremities on arrival capillary refill 3 seconds tachypneic, not obviously in severe respiratory distress. Continue on a nonrebreather. Will obtain initial vital signs, hooked up to continuous cardiac monitoring EKG to evaluate for ACS, troponin BNP, CBC CMP. Consider CT if chest x-ray does not elucidating. Initial EKG obtained shows ventricular paced rhythm at a rate of 81, left axis, prolonged QTc of 557 no significant ST deviation. Amount and/or Complexity of Data Reviewed  Labs: ordered. Decision-making details documented in ED Course. Radiology: ordered and independent interpretation performed. Decision-making details documented in ED Course.   ECG/medicine tests: ordered and independent interpretation performed. Decision-making details documented in ED Course. Risk  Prescription drug management. Decision regarding hospitalization. ED Course as of 04/05/23 2144 Wed Apr 05, 2023   1129 - Vital signs show a pulse of 73 a blood pressure of 97/80 narrow pulse pressure, MAP of 86 tachypnea to 28 satting 96% on the nonrebreather [WB]   1151 X-ray personally reviewed by myself reveals relatively stable cardiomediastinal silhouette possible effusions bilateral pacemaker in place [WB]   1158 Hemoglobin is 4.6 I did perform rectal exam showing maroon-colored stool in the setting of her transient hypotension I did consent her for blood transfusion I will order CTA of the abdomen [WB]   1214 Patient now with hypotension 60s over 40s we will give fluid bolus, reverse Xarelto which she took at 8 AM with Zeynep Pascual [WB]   1215 She is on Xarelto for atrial fibrillation [WB]   1239 CHRISTIAN with creatinine 1.87 GFR of 25 from baseline 1 [WB]   1239 In my judgment given her hypotension it is warranted to obtain CTA at this time, as she may be a candidate for lifesaving embolization [WB]   1348 We will initiate norepinephrine given persistent hypotension 70s over 30s and MAP of 50 in spite of fluid resuscitation pending blood. Blood is allocated at this time. [WB]   1349 Did speak with intensivist who will see patient, I did consult GI [WB]   1413 GI is at bedside [WB]   1414 CT shows acute GI bleed in the cecum [WB]   1414 I have attempted to call IR at [id number] without success [WB]   1421 Blood is hanging at this time, we are starting norepinephrine [WB]   1421 I was able to get in touch with angio nurse, on-call IR physician is performing a procedure at this time he will be made aware of patient [WB]      ED Course User Index  [WB] Dalia David MD     FINAL IMPRESSION     1. Hemorrhagic shock ([address]    2.  Gastrointestinal hemorrhage, unspecified gastrointestinal hemorrhage type DISPOSITION/PLAN   Mar Santacruz's  results have been reviewed with her. She has been counseled regarding her diagnosis, treatment, and plan. She verbally conveys understanding and agreement of the signs, symptoms, diagnosis, treatment and prognosis and additionally agrees to follow up as discussed. She also agrees with the care-plan and conveys that all of her questions have been answered. I have also provided discharge instructions for her that include: educational information regarding their diagnosis and treatment, and list of reasons why they would want to return to the ED prior to their follow-up appointment, should her condition change. CLINICAL IMPRESSION    Admit Note: Pt is being admitted by dr Coby Alexis. The results of their tests and reason(s) for their admission have been discussed with pt and/or available family. They convey agreement and understanding for the need to be admitted and for the admission diagnosis. I am the Primary Clinician of Record. José Miguel Dior MD (electronically signed)    (Please note that parts of this dictation were completed with voice recognition software. Quite often unanticipated grammatical, syntax, homophones, and other interpretive errors are inadvertently transcribed by the computer software. Please disregards these errors.  Please excuse any errors that have escaped final proofreading.)

## 2023-04-05 NOTE — PROGRESS NOTES
Patient transported from ER to IR s/p mass transfusion with ER RN x 2 - consent obtained for procedure from son Gilma Holland via phone call, consent obtained to keep DNR in effect asides from endotracheal intubation r/t anesthesia for procedure from sydnee Elkins via telephone. 2 RN witness to both. Of note, Son is currently admitted to hospital.     MD aware and Anesthesia aware. Spoke to Syed Donnelly MD and Terri Brown MD regarding this matter.

## 2023-04-05 NOTE — PROGRESS NOTES
1710: Pt arrived in room from IR.    1713: Verbal orders received from Fili RODRIGUEZ for CBC, BMP, PTT/INR, Fibrinogen. 1735: Garces catheter with temperature sensing inserted. Temp 33.9 C    1736: TOMMY Ruelaser initiated. 1745: Family at bedside. Extubated, on 2L O2 via NC sats > 98%. Patient lethargic post-op.

## 2023-04-05 NOTE — CONSULTS
GI CONSULTATION NOTE  Gaviota Sheffield NP  [phone] NP in-hospital cell phone M-F until 4:30  After 5pm or on weekends, please call  for physician on call    NAME: Evans Larson   :  1/15/1931   MRN:  [id number]   Attending:  Dr. Santy Calderon  Primary GI:  Dr. Ana Dutta   Date/Time:  2023 3:01 PM  Assessment:   Acute blood loss anemia  Melena  Hgb 4.6  BUN/Cr ratio 47  FOBT +  CT abd/pel:  Acute GI bleed in the cecum, which is located in the left lower quadrant of the abdomen. Background of moderate generalized constipation. Diverticulosis of the colon. No acute diverticulitis. Hypotension  SOB   IV pressors started  ICU admission     Atrial fibrillation   On Xarelto- last dose this AM    Plan:   IR consulted for possible embolization   Follow GI output; goal for hgb >7.0  PRBC ordered   Hold Xarelto   NPO for now   ICU level care   Plan discussed with Dr. Russ Corpus:     HISTORY OF PRESENT ILLNESS:     Evans Larson is an 80 y.o.  female who we are asked to see for complaint of anemia. Patient presented to the ED with fatigue and dyspnea. Per EMS the patient has been subacutely tired and short of breath for the past week with worsening today, patient described feeling like she is drowning. They arrived they found her 88% on room air initially hypotensive 72/48 with cool extremities. Prehospital EKG read MI though they did not note any ST elevation. She was placed on nonrebreather with improved O2 to 100% though it occasionally did drop in route. Blood pressure also waxed and waned up to 140/80, they are unable to obtain prehospital access.     Past Medical History:   Diagnosis Date    BBB (bundle branch block)     Heart failure ([facility] [address]     followed by Dr Chuy Orosco      Past Surgical History:   Procedure Laterality Date    CARDIAC SURG PROCEDURE UNLIST  2009    stent placed     HX APPENDECTOMY      HX  SECTION      x 3    HX HEENT      both ears    HX PACEMAKER       Social History     Tobacco Use    Smoking status: Never    Smokeless tobacco: Not on file   Substance Use Topics    Alcohol use: No      No family history on file. Allergies   Allergen Reactions    Adhesive Rash    Clindamycin Rash    Hydrocodone Other (comments)     \"felt loopy\"    Pcn [Penicillins] Rash      Prior to Admission medications    Medication Sig Start Date End Date Taking? Authorizing Provider   metroNIDAZOLE (FLAGYL) 250 mg tablet Take 250 mg by mouth two (2) times a day. Provider, Historical   rivaroxaban (XARELTO) 15 mg tab tablet Take 15 mg by mouth daily (with breakfast). Provider, Historical   furosemide (LASIX) 40 mg tablet Take 40 mg by mouth two (2) times a day. Provider, Historical   rosuvastatin (CRESTOR) 5 mg tablet Take 5 mg by mouth nightly. Other, MD Marly   lansoprazole (PREVACID) 15 mg capsule Take  by mouth Daily (before breakfast). Marly Mena MD       Patient Active Problem List   Diagnosis Code    Falls W19. XXXA    Syncope and collapse R55    Hemorrhagic shock (HCC) R57.8       REVIEW OF SYSTEMS:    Constitutional: negative fever, negative chills, negative weight loss  Eyes:   negative visual changes  ENT:   negative sore throat, tongue or lip swelling   Respiratory:  negative cough, negative dyspnea  Cards:  negative for chest pain, palpitations, lower extremity edema  GI:   See HPI  :  negative for frequency, dysuria  Integument:  negative for rash and pruritus  Heme:  negative for easy bruising and gum/nose bleeding  Musculoskel: negative for myalgias,  back pain and muscle weakness  Neuro: negative for headaches, dizziness, vertigo  Psych: negative for feelings of anxiety, depression     Objective:   VITALS:    Visit Vitals  BP (!) 73/40   Pulse 70   Temp (!) 93.5 °F (34.2 °C)   Resp 17   Ht 4' 9\" (1.448 m)   Wt 63.3 kg (139 lb 8.8 oz)   SpO2 100%   BMI 30.20 kg/m²       PHYSICAL EXAM:   General:          Acutely ill-appearing  female. Head:               Normocephalic, without obvious abnormality, atraumatic. Eyes:               Conjunctivae clear and pale, anicteric sclerae. Pupils are equal  Nose:               Nares normal. No drainage or sinus tenderness. Throat:             Lips, mucosa, and tongue normal.  No Thrush  Neck:               Supple, symmetrical,  no adenopathy, thyroid: non tender  Back:               Symmetric,  No CVA tenderness. Lungs:             CTA bilaterally. No wheezing/rhonchi/rales. Chest wall:      No tenderness or deformity. No Accessory muscle use. Heart:              Regular rate and rhythm,  no murmur, rub or gallop. Abdomen:        Soft, non-tender. Not distended. Bowel sounds normal. No masses  Extremities:     Atraumatic, No cyanosis. No edema. No clubbing  Skin:                Texture, turgor normal. No rashes/lesions/jaundice  Psych:             Good insight. Not depressed. Not anxious or agitated. Neurologic:      EOMs intact. No facial asymmetry. No aphasia or slurred speech. A/O X 3.      LAB DATA REVIEWED:    Recent Results (from the past 24 hour(s))   EKG, 12 LEAD, INITIAL    Collection Time: 04/05/23 11:16 AM   Result Value Ref Range    Ventricular Rate 81 BPM    Atrial Rate 80 BPM    QRS Duration 142 ms    Q-T Interval 480 ms    QTC Calculation (Bezet) 557 ms    Calculated R Axis -57 degrees    Calculated T Axis 100 degrees    Diagnosis       Ventricular-paced rhythm with occasional premature ventricular complexes  Biventricular pacemaker detected  When compared with ECG of 21-SEP-2022 10:06,  premature ventricular complexes are now present     CBC WITH AUTOMATED DIFF    Collection Time: 04/05/23 11:32 AM   Result Value Ref Range    WBC 8.3 3.6 - 11.0 K/uL    RBC 2.16 (L) 3.80 - 5.20 M/uL    HGB 4.6 (LL) 11.5 - 16.0 g/dL    HCT 15.4 (LL) 35.0 - 47.0 %    MCV 71.3 (L) 80.0 - 99.0 FL    MCH 21.3 (L) 26.0 - 34.0 PG    MCHC 29.9 (L) 30.0 - 36.5 g/dL    RDW 17.6 (H) 11.5 - 14.5 %    PLATELET 231 334 - 238 K/uL    MPV 11.8 8.9 - 12.9 FL    NRBC 4.1 (H) 0  WBC    ABSOLUTE NRBC 0.34 (H) 0.00 - 0.01 K/uL    NEUTROPHILS 86 (H) 32 - 75 %    LYMPHOCYTES 6 (L) 12 - 49 %    MONOCYTES 7 5 - 13 %    EOSINOPHILS 0 0 - 7 %    BASOPHILS 0 0 - 1 %    IMMATURE GRANULOCYTES 1 (H) 0.0 - 0.5 %    ABS. NEUTROPHILS 7.1 1.8 - 8.0 K/UL    ABS. LYMPHOCYTES 0.5 (L) 0.8 - 3.5 K/UL    ABS. MONOCYTES 0.6 0.0 - 1.0 K/UL    ABS. EOSINOPHILS 0.0 0.0 - 0.4 K/UL    ABS. BASOPHILS 0.0 0.0 - 0.1 K/UL    ABS. IMM. GRANS. 0.1 (H) 0.00 - 0.04 K/UL    DF AUTOMATED      RBC COMMENTS MICROCYTOSIS  1+        RBC COMMENTS HYPOCHROMIA  2+        RBC COMMENTS POLYCHROMASIA  1+        RBC COMMENTS OVALOCYTES  1+        RBC COMMENTS POIKILOCYTOSIS  1+        RBC COMMENTS ANISOCYTOSIS  1+       METABOLIC PANEL, COMPREHENSIVE    Collection Time: 04/05/23 11:32 AM   Result Value Ref Range    Sodium 131 (L) 136 - 145 mmol/L    Potassium 4.2 3.5 - 5.1 mmol/L    Chloride 96 (L) 97 - 108 mmol/L    CO2 22 21 - 32 mmol/L    Anion gap 13 5 - 15 mmol/L    Glucose 256 (H) 65 - 100 mg/dL    BUN 88 (H) 6 - 20 MG/DL    Creatinine 1.87 (H) 0.55 - 1.02 MG/DL    BUN/Creatinine ratio 47 (H) 12 - 20      eGFR 25 (L) >60 ml/min/1.73m2    Calcium 8.4 (L) 8.5 - 10.1 MG/DL    Bilirubin, total 0.6 0.2 - 1.0 MG/DL    ALT (SGPT) 64 12 - 78 U/L    AST (SGOT) 115 (H) 15 - 37 U/L    Alk.  phosphatase 94 45 - 117 U/L    Protein, total 5.8 (L) 6.4 - 8.2 g/dL    Albumin 2.4 (L) 3.5 - 5.0 g/dL    Globulin 3.4 2.0 - 4.0 g/dL    A-G Ratio 0.7 (L) 1.1 - 2.2     TROPONIN-HIGH SENSITIVITY    Collection Time: 04/05/23 11:32 AM   Result Value Ref Range    Troponin-High Sensitivity 87 (H) 0 - 51 ng/L   NT-PRO BNP    Collection Time: 04/05/23 11:32 AM   Result Value Ref Range    NT pro-BNP 2,183 (H) <450 PG/ML   PATHOLOGIST REVIEW    Collection Time: 04/05/23 11:32 AM   Result Value Ref Range    Pathologist review        Pathologic examination results can be viewed in [address] [facility] Chart Review under the Pathology tab. SAMPLES BEING HELD    Collection Time: 04/05/23 11:36 AM   Result Value Ref Range    SAMPLES BEING HELD BLUE     COMMENT        Add-on orders for these samples will be processed based on acceptable specimen integrity and analyte stability, which may vary by analyte. RBC, ALLOCATE    Collection Time: 04/05/23 12:00 PM   Result Value Ref Range    HISTORY CHECKED?  Historical check performed    TYPE & SCREEN    Collection Time: 04/05/23 12:37 PM   Result Value Ref Range    Crossmatch Expiration 04/08/2023,2359     ABO/Rh(D) B POSITIVE     Antibody screen NEG     Unit number [id number]     Blood component type RC LR     Unit division 00     Status of unit ISSUED     Crossmatch result Compatible     Unit number [id number]     Blood component type RC LR     Unit division 00     Status of unit ISSUED     Crossmatch result Compatible     Unit number [id number]     Blood component type RC LR     Unit division 00     Status of unit ALLOCATED     Crossmatch result Compatible     Unit number [id number]     Blood component type RC LR     Unit division 00     Status of unit ALLOCATED     Crossmatch result Compatible     Unit number [id number]     Blood component type RC LR     Unit division 00     Status of unit ALLOCATED     Crossmatch result Compatible     Unit number [id number]     Blood component type RC LR     Unit division 00     Status of unit ALLOCATED     Crossmatch result Compatible    OCCULT BLOOD, STOOL    Collection Time: 04/05/23 12:38 PM   Result Value Ref Range    Occult blood, stool Positive (A) NEG         IMAGING RESULTS:  I have personally reviewed the imaging reports      Total time spent with patient: 25 minutes ________________________________________________________________________  Care Plan discussed with:  Patient x   Family     RN x              Consultant:  JOSETTE RODRIGUEZ     CT 4/5/2023:  ________________________________________________________________________    ___________________________________________________  Consulting Provider: Sadie Omer NP      4/5/2023  3:01 PM

## 2023-04-05 NOTE — ANESTHESIA POSTPROCEDURE EVALUATION
* No procedures listed *.    general    Anesthesia Post Evaluation      Multimodal analgesia: multimodal analgesia used between 6 hours prior to anesthesia start to PACU discharge  Patient location during evaluation: ICU  Patient participation: complete - patient cannot participate  Post-procedure mental status: intubated and sedated. Pain management: adequate  Airway patency: patent  Anesthetic complications: no  Cardiovascular status: acceptable, blood pressure returned to baseline and hemodynamically stable  Respiratory status: ETT, intubated and acceptable  Hydration status: acceptable  Post anesthesia nausea and vomiting:  none  Final Post Anesthesia Temperature Assessment:  Normothermia (36.0-37.5 degrees C)      INITIAL Post-op Vital signs:   Vitals Value Taken Time   BP 64/10 04/05/23 1718   Temp 36.8 °C (98.2 °F) 04/05/23 1715   Pulse 71 04/05/23 1726   Resp 23 04/05/23 1726   SpO2 100 % 04/05/23 1726   Vitals shown include unvalidated device data.

## 2023-04-05 NOTE — PROCEDURES
Interventional Radiology  Procedure Note        4/5/2023 4:53 PM    Patient: Digna Whitmore     Informed consent obtained    Diagnosis: Lower GI bleed    Procedure(s): Arteriography of SMA, right colic, ileocolic arteries; no active bleeding    Specimens removed:  none    Complications: None    Primary Physician: Mario Hardy MD    Recommendations: N/A    Discharge Disposition: to ICU    Full dictated report to follow    Mario Hardy MD  Interventional Radiology  [facility]  4:53 PM, 4/5/2023

## 2023-04-05 NOTE — ANESTHESIA PREPROCEDURE EVALUATION
Relevant Problems   No relevant active problems       Anesthetic History   No history of anesthetic complications            Review of Systems / Medical History  Patient summary reviewed, nursing notes reviewed and pertinent labs reviewed    Pulmonary                   Neuro/Psych   Within defined limits           Cardiovascular          CHF  Dysrhythmias   Pacemaker         GI/Hepatic/Renal         Renal disease: ARF      Comments: GI bleed from the cecum Endo/Other        Anemia     Other Findings              Physical Exam    Airway  Mallampati: II  TM Distance: > 6 cm  Neck ROM: normal range of motion   Mouth opening: Normal     Cardiovascular    Rhythm: regular  Rate: normal         Dental  No notable dental hx       Pulmonary  Breath sounds clear to auscultation               Abdominal  GI exam deferred       Other Findings            Anesthetic Plan    ASA: 4, emergent  Anesthesia type: general          Induction: RSI  Anesthetic plan and risks discussed with: Patient

## 2023-04-05 NOTE — H&P
Radiology History and Physical    Patient: Britany Julio 80 y.o. female       Chief Complaint: Shortness of Breath (SPO2 88% on NRB at 100%. Per EMS picked up from home.  )      History of Present Illness: 80year old woman with blood per rectum; CTA shows active bleeding in the cecum. Requiring pressors, multiple transfusions. History:    Past Medical History:   Diagnosis Date    BBB (bundle branch block)     Heart failure ([facility] [address]     followed by Dr Pina Valenzuela     No family history on file. Social History     Socioeconomic History    Marital status:      Spouse name: Not on file    Number of children: Not on file    Years of education: Not on file    Highest education level: Not on file   Occupational History    Not on file   Tobacco Use    Smoking status: Never    Smokeless tobacco: Not on file   Substance and Sexual Activity    Alcohol use: No    Drug use: No    Sexual activity: Not on file   Other Topics Concern    Not on file   Social History Narrative    Not on file     Social Determinants of Health     Financial Resource Strain: Not on file   Food Insecurity: Not on file   Transportation Needs: Not on file   Physical Activity: Not on file   Stress: Not on file   Social Connections: Not on file   Intimate Partner Violence: Not on file   Housing Stability: Not on file       Allergies:    Allergies   Allergen Reactions    Adhesive Rash    Clindamycin Rash    Hydrocodone Other (comments)     \"felt loopy\"    Pcn [Penicillins] Rash       Current Medications:  Current Facility-Administered Medications   Medication Dose Route Frequency    0.9% sodium chloride infusion 250 mL  250 mL IntraVENous PRN    NOREPINephrine (LEVOPHED) 8 mg in 5% dextrose 250mL (32 mcg/mL) infusion  0.5-16 mcg/min IntraVENous TITRATE    sodium chloride (NS) flush 5-40 mL  5-40 mL IntraVENous Q8H    sodium chloride (NS) flush 5-40 mL  5-40 mL IntraVENous PRN    acetaminophen (TYLENOL) tablet 650 mg  650 mg Oral Q6H PRN    Or acetaminophen (TYLENOL) suppository 650 mg  650 mg Rectal Q6H PRN    polyethylene glycol (MIRALAX) packet 17 g  17 g Oral DAILY PRN    ondansetron (ZOFRAN ODT) tablet 4 mg  4 mg Oral Q8H PRN    Or    ondansetron (ZOFRAN) injection 4 mg  4 mg IntraVENous Q6H PRN    heparinized saline 2 units/mL infusion 1,600 Units  800 mL Irrigation ONCE    lidocaine (XYLOCAINE) 20 mg/mL (2 %) injection 360 mg  18 mL SubCUTAneous ONCE    iopamidoL (ISOVUE-370) 370 mg iodine /mL (76 %) injection 300 mL  300 mL IntraarTERial ONCE    [START ON 4/6/2023] pantoprazole (PROTONIX) 40 mg in 0.9% sodium chloride 10 mL injection  40 mg IntraVENous DAILY     Current Outpatient Medications   Medication Sig    metroNIDAZOLE (FLAGYL) 250 mg tablet Take 250 mg by mouth two (2) times a day. rivaroxaban (XARELTO) 15 mg tab tablet Take 15 mg by mouth daily (with breakfast). furosemide (LASIX) 40 mg tablet Take 40 mg by mouth two (2) times a day. rosuvastatin (CRESTOR) 5 mg tablet Take 5 mg by mouth nightly. lansoprazole (PREVACID) 15 mg capsule Take  by mouth Daily (before breakfast). Facility-Administered Medications Ordered in Other Encounters   Medication Dose Route Frequency    etomidate (AMIDATE) 2 mg/mL injection   IntraVENous PRN    lidocaine (PF) (XYLOCAINE) 20 mg/mL (2 %) injection   IntraVENous PRN    succinylcholine (ANECTINE) injection   IntraVENous PRN    PHENYLephrine (NEOSYNEPHRINE) in NS syringe   IntraVENous PRN    PHENYLephrine (MAX-SYNEPHRINE) 10 mg in 0.9% sodium chloride 250 mL infusion   IntraVENous CONTINUOUS    0.9% sodium chloride infusion   IntraVENous CONTINUOUS    dexamethasone (DECADRON) 4 mg/mL injection   IntraVENous PRN    ondansetron (ZOFRAN) injection   IntraVENous PRN        Physical Exam:  Blood pressure (!) 74/59, pulse 70, temperature (!) 93.5 °F (34.2 °C), resp. rate 18, height 4' 9\" (1.448 m), weight 63.3 kg (139 lb 8.8 oz), SpO2 100 %.   GENERAL: Somewhat confused, intermittently lethargic  LUNG: Nonlabored respiration on 2L O2  HEART: paced rhythm    Alerts:    Hospital Problems  Never Reviewed            Codes Class Noted POA    Hemorrhagic shock ([facility] [address] ICD-10-CM: R57.8  ICD-9-CM: 785.59  4/5/2023 Unknown           Laboratory:      Recent Labs     04/05/23  1132   HGB 4.6*   HCT 15.4*   WBC 8.3      BUN 88*   CREA 1.87*   K 4.2         Plan of Care/Planned Procedure:  Risks, benefits, and alternatives reviewed with patient and she agrees to proceed with the procedure.      Visceral angiography with possible intervention    Sedation by anesthesia team    Elizabeth Bond MD  Interventional Radiology  [facility].  4:52 PM, 4/5/2023

## 2023-04-05 NOTE — ED NOTES
Verbal shift change report given to [facility] by Cele Coelho RN for patient coming to ICU bed 2550. Report included the following information. SBAR, Kardex, ED Summary, and MAR. Opportunity for questions was provided following patient summary.

## 2023-04-05 NOTE — PROGRESS NOTES
1710 Received patient intubated from OR. Patient placed on PS 10 PEEP 5 Fi02 40%. MD at bedside and advised we can extubate soon and no need for ABG prior to extubating. Patient passed SBT and RSBI 18.    1740 MD and family at bedside. Patient extubated to 2L NC. BBS equal and clear at this time.

## 2023-04-05 NOTE — PROGRESS NOTES
Consent obtained over the phone from Dylan Noel, Son. Family members at bedside, Patient disoriented at this time.

## 2023-04-05 NOTE — CONSULTS
Name: Sajan Patel   : 1/15/1931   MRN: [id number]   Date: 2023        Chief Complaint   Patient presents with    Shortness of Breath     SPO2 88% on NRB at 100%. Per EMS picked up from home. HPI:     Sajan Patel is 80 y.o.  female with history of coronary artery disease, CHF, paroxysmal A-fib status post ablation and permanent pacemaker placement, hyperlipidemia and GERD. She was brought to emergency department with complaint of feeling weak and lethargic and also maroon-colored stools. She takes Xarelto for her atrial fibrillation. In ER she was found to have hemoglobin of 4.6 and also hypotensive. Critical care consult is requested for admission to ICU. Patient is currently lethargic and sleepy, according to nurses when she came in she was more alert. She is on 4 mics of Levophed. Her son who lives with her is admitted in the hospital.  Her granddaughter and her caregiver came to bedside. I am unable to obtain meaningful review of systems from the patient. Active Problems Being Managed:     --Hemorrhagic shock. - Active bleeding in cecum.  - Acute blood loss anemia. - Acute metabolic encephalopathy. -CAD with stents/Chr Sys CHF/Pafib with Biv PPM and ablation.  - Acute renal failure. - Mild hyponatremia. Assessment/Plan:     Hemorrhagic shock.  - From lower GI bleeding. CTA confirms bleeding in cecal area. - IR has been called, patient is going for emergent embolization of bleeding source. - I initiated massive transfusion protocol, patient will be transfused 4 units of PRBCs, 1 unit of platelet and 4 units of FFP.  - Titrate pressors for goal MAP of more than 65. - Serial hemoglobin monitoring every 4 hours. - Closely monitor hemodynamics and markers of endorgan perfusion including mental status, urine output and lactic acid. Acute metabolic encephalopathy.  - Closely monitor mental status. - Delirium precautions.   - Correct underlying metabolic issues. Acute renal failure.  -Likely prerenal/ATN etiology in setting of hemorrhagic shock.  -Closely monitor renal functions and UO. -Avoid nephrotoxins.  - Volume resuscitation as tolerated. History of systolic heart failure. - LVEF around 45 to 50%. - No evidence of decompensation.  - Currently hypovolemic. Hold her diuretics. History of paroxysmal A-fib status post ablation and PPM.  - Patient has been on Xarelto for A-fib related stroke prevention. She is not a good candidate for long-term anticoagulation. DVT prophylaxis: SCD  SUP: Takes Prevacid at home, continue PPI on formulary. Code status: DNR, confirmed with the son. Next of kin: Jami Holly Linsey Mares)   [phone] [facility])    Plan was discussed with nursing staff, ED attending, patient's son and granddaughter along with caregiver. Patient has very high risk of further decompensation and very high mortality risk. I personally spent 120 minutes of critical care time. This is time spent at this critically ill patient's bedside actively involved in patient care as well as the coordination of care and discussions with the patient's family. This does not include any procedural time which has been billed separately. Review of systems:     Review of Systems   Unable to perform ROS: Medical condition        Objective:     Vital Signs:  Visit Vitals  BP (!) 74/59   Pulse 70   Temp (!) 93.5 °F (34.2 °C)   Resp 18   Ht 4' 9\" (1.448 m)   Wt 63.3 kg (139 lb 8.8 oz)   SpO2 100%   BMI 30.20 kg/m²    O2 Flow Rate (L/min): 11 l/min O2 Device: Non-rebreather mask Temp (24hrs), Av.7 °F (34.8 °C), Min:93.5 °F (34.2 °C), Max:97 °F (36.1 °C)           Intake/Output:     Intake/Output Summary (Last 24 hours) at 2023 1539  Last data filed at 2023 1528  Gross per 24 hour   Intake 2550 ml   Output --   Net 2550 ml       Physical Exam:  Physical Exam  Constitutional:       Comments: Altered mental status. HENT:      Head: Normocephalic and atraumatic. Mouth/Throat:      Mouth: Mucous membranes are moist.   Eyes:      Conjunctiva/sclera: Conjunctivae normal.   Cardiovascular:      Rate and Rhythm: Normal rate and regular rhythm. Pulmonary:      Effort: Pulmonary effort is normal. No respiratory distress. Abdominal:      General: There is no distension. Palpations: Abdomen is soft. Tenderness: There is no abdominal tenderness. There is no guarding. Musculoskeletal:      Cervical back: Neck supple. Past Medical History:        has a past medical history of BBB (bundle branch block) and Heart failure ([address] Past Surgical History:      has a past surgical history that includes hx pacemaker; hx appendectomy; hx  section; hx heent; and pr unlisted procedure cardiac surgery (). Home Medications:     Prior to Admission medications    Medication Sig Start Date End Date Taking? Authorizing Provider   metroNIDAZOLE (FLAGYL) 250 mg tablet Take 250 mg by mouth two (2) times a day. Provider, Historical   rivaroxaban (XARELTO) 15 mg tab tablet Take 15 mg by mouth daily (with breakfast). Provider, Historical   furosemide (LASIX) 40 mg tablet Take 40 mg by mouth two (2) times a day. Provider, Historical   rosuvastatin (CRESTOR) 5 mg tablet Take 5 mg by mouth nightly. Other, MD Marly   lansoprazole (PREVACID) 15 mg capsule Take  by mouth Daily (before breakfast). Other, MD Marly         Allergies/Social/Family History: Allergies   Allergen Reactions    Adhesive Rash    Clindamycin Rash    Hydrocodone Other (comments)     \"felt loopy\"    Pcn [Penicillins] Rash      Social History     Tobacco Use    Smoking status: Never    Smokeless tobacco: Not on file   Substance Use Topics    Alcohol use: No      No family history on file.       LABS AND  DATA:   Reviewed      Peak airway pressure:      Minute ventilation:        CRITICAL CARE CONSULTANT NOTE  I had a face to face encounter with the patient, reviewed and interpreted patient data including clinical events, labs, images, vital signs, I/O's, and examined patient. I have discussed the case and the plan and management of the patient's care with the consulting services, the bedside nurses and the respiratory therapist.      NOTE OF PERSONAL INVOLVEMENT IN CARE   This patient has a high probability of imminent, clinically significant deterioration, which requires the highest level of preparedness to intervene urgently. I participated in the decision-making and personally managed or directed the management of the following life and organ supporting interventions that required my frequent assessment to treat or prevent imminent deterioration.         Justin Slater, [address] [facility]  [phone]  4/5/2023

## 2023-04-05 NOTE — CONSENT
Informed Consent for Blood Component Transfusion Note    I have discussed with the patient the rationale for blood component transfusion; its benefits in treating or preventing fatigue, organ damage, or death; and its risk which includes mild transfusion reactions, rare risk of blood borne infection, or more serious but rare reactions. I have discussed the alternatives to transfusion, including the risk and consequences of not receiving transfusion. The patient had an opportunity to ask questions and had agreed to proceed with transfusion of blood components.     Electronically signed by Toma Ocasio MD on 4/5/23 at 11:59 AM

## 2023-04-05 NOTE — PROGRESS NOTES
Patient transported to ICU with Anesthesia on continuous monitoring, MTP unused cooler of blood left with primary RN as well as patients hearing aids in a marked cup.      Orders placed for nursing:  Patient must lay flat for 2 hours s/p procedure, order end time 7pm 4/5/2023    Patient must keep right leg flat and straight for 6 hours post procedure, order end time 11 pm    Procedure Start : 0372  Procedure End : 1700

## 2023-04-06 PROBLEM — R53.81 PHYSICAL DEBILITY: Status: ACTIVE | Noted: 2023-04-06

## 2023-04-06 PROBLEM — Z51.5 PALLIATIVE CARE BY SPECIALIST: Status: ACTIVE | Noted: 2023-04-06

## 2023-04-06 PROBLEM — R53.81 PHYSICAL DEBILITY: Status: ACTIVE | Noted: 2023-01-01

## 2023-04-06 PROBLEM — Z51.5 PALLIATIVE CARE BY SPECIALIST: Status: ACTIVE | Noted: 2023-01-01

## 2023-04-06 PROBLEM — Z71.89 GOALS OF CARE, COUNSELING/DISCUSSION: Status: ACTIVE | Noted: 2023-01-01

## 2023-04-06 PROBLEM — Z71.89 GOALS OF CARE, COUNSELING/DISCUSSION: Status: ACTIVE | Noted: 2023-04-06

## 2023-04-06 PROBLEM — R54 FRAILTY: Status: ACTIVE | Noted: 2023-04-06

## 2023-04-06 PROBLEM — R54 FRAILTY: Status: ACTIVE | Noted: 2023-01-01

## 2023-04-06 PROCEDURE — C9113 INJ PANTOPRAZOLE SODIUM, VIA: HCPCS | Performed by: HOSPITALIST

## 2023-04-06 PROCEDURE — 65610000006 HC RM INTENSIVE CARE

## 2023-04-06 PROCEDURE — 74011250636 HC RX REV CODE- 250/636: Performed by: HOSPITALIST

## 2023-04-06 PROCEDURE — 74011250636 HC RX REV CODE- 250/636: Performed by: NURSE PRACTITIONER

## 2023-04-06 PROCEDURE — 74011250637 HC RX REV CODE- 250/637: Performed by: HOSPITALIST

## 2023-04-06 PROCEDURE — 74011000250 HC RX REV CODE- 250: Performed by: HOSPITALIST

## 2023-04-06 PROCEDURE — 77010033678 HC OXYGEN DAILY

## 2023-04-06 PROCEDURE — 99222 1ST HOSP IP/OBS MODERATE 55: CPT | Performed by: NURSE PRACTITIONER

## 2023-04-06 RX ADMIN — CASTOR OIL AND BALSAM, PERU: 788; 87 OINTMENT TOPICAL at 21:43

## 2023-04-06 RX ADMIN — SODIUM CHLORIDE, PRESERVATIVE FREE 10 ML: 5 INJECTION INTRAVENOUS at 05:05

## 2023-04-06 RX ADMIN — MORPHINE SULFATE 1 MG: 2 INJECTION, SOLUTION INTRAMUSCULAR; INTRAVENOUS at 16:02

## 2023-04-06 RX ADMIN — CASTOR OIL AND BALSAM, PERU: 788; 87 OINTMENT TOPICAL at 08:26

## 2023-04-06 RX ADMIN — PANTOPRAZOLE SODIUM 40 MG: 40 INJECTION, POWDER, FOR SOLUTION INTRAVENOUS at 08:22

## 2023-04-06 RX ADMIN — SODIUM CHLORIDE, PRESERVATIVE FREE 10 ML: 5 INJECTION INTRAVENOUS at 16:02

## 2023-04-06 RX ADMIN — SODIUM CHLORIDE, PRESERVATIVE FREE 10 ML: 5 INJECTION INTRAVENOUS at 21:43

## 2023-04-06 NOTE — HOSPICE
[employer] RN note:  consult noted. Reviewing chart. Discussed pt with palliative NP Yee Painting. Note plans for discharge asap. 3:30---both sons Cheko Jean-Baptiste and daniela at bedside, finalized decision for taking pt home with hospice. [facility] arranged for 10:00am, hospice admission at 11:00am.  Pt has an AICD through [employer], all agree to deactivation. Will coordinate with rep to deactivate prior to discharge. Consents forms signed by son Cici Montoya including Rebel Haynes. Per [employer] rep, pt has a pacemaker but not an AICD. Will inform family as they believed the device had a shocking component. Franco Temple [birth year] #[id number]   DX:  Consult Date: 4/6/2023   Discharge date: 4/7/2023   Transportation arranged for 10:00am, admission at 11:00. DNR:  yes, copied for family and on chart for transport  Consents: yes, scanned to consents folder     Meds: SRK through [facility], morphine and ativan through [facility] to go with fmaily at discharge Equip: bed, obt, 02 for delivery between 3-7 contact is Carolin Noirega conf #[id number]   Clinical Notes: [address] [address] [facility]. Pt's son and wife live with pt, several other family members with planning to help with some caregiver support. Thank you for the opportunity to care for this pt and family. Please contact hospice at [phone] with any questions or concerns.

## 2023-04-06 NOTE — PROGRESS NOTES
Transition of Care Plan:    RUR:14%    Disposition:Home with family    If SNF or IPR: Date FOC offered:N/A    Date [facility] received:N/A    Date authorization started with reference number:N/A    Date authorization received and expires:N/A    Accepting facility:N/A    Follow up appointments:N/A    DME needed:None    Trans[facility] at Discharge: [facility]      Keys or means to access home:  Family has keys        IM Medicare Letter:Given 4/5/23    Is patient a [place] and connected with the [place]? No                If yes, was [employer] transfer form completed and VA notified? N/A    Caregiver Contact:Son    Discharge Caregiver contacted prior to discharge? Caregiver to be contacted prior to discharge. Care Conference needed?:    No        Reason for Admission:  Patient came to ed for fatigue, dyspnea and hypotention.                       RUR Score:   14%                  Plan for utilizing home health:    [email or web address]      PCP: First and Last name:  Dr Almanzar Left     Name of Practice: [facility]     Are you a current patient: Yes/No: Yes     Approximate date of last visit: Month ago     Can you participate in a virtual visit with your PCP: No                    Current Advanced Directive/Advance Care Plan: DNR  Advance Care Planning     General Advance Care Planning (ACP) Conversation      Date of Conversation: 4/6/23  Conducted with: Patient with Decision Making Capacity and Healthcare Decision Maker: Next of Kin by law (only applies in absence of a Healthcare Power of  or Legal Guardian)    Healthcare Decision Maker:     Primary Decision Maker (Active): Leia Chan - [phone]  Click here to [address] including selection of the Healthcare Decision Maker Relationship (ie \"Primary\")        Content/Action Overview:   DECLINED ACP conversation - will revisit periodically   Reviewed DNR/DNI and patient confirms current DNR status - completed forms on file (place new order if needed)         Length of Voluntary ACP Conversation in minutes:  <16 minutes (Non-Billable)    Abdirahman Person RN                 Healthcare Decision Maker:   Click here to complete [place] including selection of the Healthcare Decision Maker Relationship (ie \"Primary\")             Primary Decision Maker (Active): Rebeka amel - [phone]                  Confirmed demographics and pcp information with patient's son. Patient lives in one story home and her grand daughter, caregiver and one of her son's live with her. He states patient was needing assistance at home with adl's. She does not use home oxygen. They are looking at taking patient with [facility]. [facility] and [facility] will follow  up. Lilian Zamora RN BSN CRM        [phone]

## 2023-04-06 NOTE — PROGRESS NOTES
1400- Bedside and Verbal shift change report given to Mimi rn (oncoming nurse) by Ivett Mathews RN (offgoing nurse). Report included the following information SBAR, Kardex, Intake/Output, MAR, and Recent Results. 1400- Shift assessment completed. Patient's family at the bedside waiting  to have conversation with hospice nurse. [address] at the bedside; initiated patient to comfort measures.

## 2023-04-06 NOTE — PROGRESS NOTES
Plans for discharge with Hospice noted. Discussed on MDRs and with Edwina Breen of Palliative Care. I have met with son, Lachelle Cisneros. Will arrange for discharge    Yordanaleta Donaldson, [address] [facility]  [phone]  4/6/2023 1:04 PM      ADD: Updated plan is now home with Hospice tomorrow.  In meantime, we will provide comfort measures only    Yordan Donaldson, [address] [facility]  [phone]  4/6/2023 1:08 PM

## 2023-04-06 NOTE — PROGRESS NOTES
Palliative Medicine SW Note    LCSW met son and dil in the hallway and notified [place] of [address] [place] [address] liaison so she could meet with them to plan hospice admission. Son stated his mother has told them she wants to be home. This writer affirmed their care for their loved one. Advised them to return wheelchair he used to the volunteer desk downstairs.     Carmelita Dye [facility]  Palliative Medicine [phone] ([phone])

## 2023-04-06 NOTE — PROGRESS NOTES
1900: Bedside and Verbal shift change report given to Pete Garcia RN (oncoming nurse) by Za Hernández RN (offgoing nurse). Report included the following information SBAR, Kardex, ED Summary, Procedure Summary, Intake/Output, MAR, Recent Results, Med Rec Status, and Cardiac Rhythm VPACED .     2000: Shift assessment complete. Pt is drowsy, opens eyes to pain, following some commands moves all extrem spontaneously. Unable to answer orientation questions. Lung sounds are clear/diminished in the bases, currentlyo on 2L NC. Pt is Vpaced, BP stable currently on 10 mcg norepinephrine. Pulses palpable in all four extrem. Abd is soft, bowel sounds active. Garces is patent and draining clear tanesha fluid. Skin is dry and warm. Puncture sites on L fem and R radial are clean dry and intact, no bleeding hematoma present. See flow sheet for further details. 2215: Pt grimacing, restless and tachypneic with intermittent moaning, Appears to be in pain. Lisandra Bernstein, NP notified, orders received. 2230: Pt lungs are now coarse pts cough weak and not able to clear secretions. Lisandra Bernstein, NP notified, verbal orders to NT suction. 2245: Pt NT suctioned with only small about of secretions cleared. Pt slight less coarse. CT physiotherapy performed via bed. 0000: Reassessment complete. Pt resting comfortably, no acute changes     0100: Pt becoming increasingly coarse with expiratory crackles, Pt NT suctioned with moderate amount of thick yellow sputum    0400: Reassessment complete, no acute changes    0700: Bedside and Verbal shift change report given to Za Hernández RN (oncoming nurse) by Pete Garcia RN (offgoing nurse). Report included the following information SBAR, Kardex, Procedure Summary, Intake/Output, MAR, Recent Results, Med Rec Status, and Cardiac Rhythm VPACED .

## 2023-04-06 NOTE — PROGRESS NOTES
0700- Bedside and Verbal shift change report given to Aakash Montero RN/Kirby RN by Benuel Snellen, RN. Report included the following information SBAR, Kardex, ED Summary, Procedure Summary, Intake/Output, MAR, and Cardiac Rhythm Ventricular Paced . 0800- Shift assessment complete. Family at bedside, pt alert and awake responding to name and birth-date only. Lung sounds clear/diminished, currently on room air. Palpable pulses on all four extremities. Repositioned and turned on right side, venalex applied to sacrum and bilateral heels. See flow sheet for more details. 0930- Verbal report given by Hannah Momin RN during interdisciplinary rounds. Dr. Jairo Christopher provided verbal orders to turn of Levo gtt. Gtt turned off at [phone]- Pt family at bedside     1200- Reassessment complete, bed bath performed, linen change, gown change, pad changed, oral care provided. Pt resting comfortably, no acute changes. Pt family still at bedside    56- Waiting on pt son to arrive to speak with Mary Ann Vieira of Palliative care. Plan is home with hospice tomorrow per Dr. Jairo Christopher. Pt on comfort measures only. Pt family still at bedside    0- Abigail with Hospice in to see pt and family at bedside. Both sons at bedside and finalized decision to take pt home with hospice. Pt will be leaving tomorrow at 10 AM with home hospice. Family still at bedside    1600- Reassessment complete, no acute changes noted. Patient has been restless, pulling at lines, leads, and arm band. Given morphine for the restlessness. 36- Pt continues to be restless, an arm sleeve was placed to prevent pulling left AC and pt was given an activity board to fidget with. Family left the bedside     1900- End of shift bedside and verbal report given to Basia Henriquez RN by Aakash Montero RN/BREANNA Le. Shift report to include SBAR, Kardex, Intake/Output, Cardiac Rhythm Vpaced. Noted that pt is still restless and trying to pull lines.

## 2023-04-06 NOTE — CONSULTS
Palliative Medicine Consult  Marek: [phone])    Patient Name: Hyacinth Crooks  YOB: 1931    Date of Initial Consult: 4/6/23  Date of Today's Visit: 4/6/2023  Reason for Consult: Care Decisions  Requesting Provider: Marilia Del Toro   Primary Care Physician: Barbara Gallegos MD     SUMMARY:   Hyacinth Crooks is a 80 y.o. with a past history of CAD, CHF, a-fib, PPM, HLD, and GERD, who was admitted on 4/5/2023 from home with a diagnosis of hemorrhagic shock- she is now s/p ablation and transfusions but family is hoping for home with hospice     PALLIATIVE DIAGNOSES:   Frailty  Goals of care  Physical debility  Palliative Care     PLAN:   Met with patient and one son who was at bedside a few times this morning- he shared that the family is clear they want to take her home with hospice. They are hoping for today if possible. I coordinated with Abigail from [facility] as they have an admission slot this afternoon, and planned to come back to talk to her other son Tangela Julien per his request, but as the day progressed, Tammy Staples was able to connect with them and will meet with the family herself to work out next steps. I suspect she might need equipment, as her bed does not have the ability to raise and lower, but family to make the decision on whether this is more important to them than timing.     In the meantime, no escalation of care, and a focus on comfort until we can get her home- defer the rest of logistics to hospice  Initial consult note routed to primary continuity provider and/or primary health care team members  Communicated plan of care with: Palliative Sophy YANES 192 Team     GOALS OF CARE / TREATMENT PREFERENCES:     GOALS OF CARE:       TREATMENT PREFERENCES:   Code Status: DNR    Advance Care Planning:  [x] The [facility] Interdisciplinary Team has updated the ACP Navigator with Abelino and Patient Capacity      Primary Decision Maker (Active): Chris Brower - [phone]              Other:    As far as possible, the palliative care team has discussed with patient / health care proxy about goals of care / treatment preferences for patient. 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     Clinical Pain Assessment (nonverbal scale for severity on nonverbal patients): Activity (Movement): Seeking attention through movement or slow, cautious movement    Duration: for how long has pt been experiencing pain (e.g., 2 days, 1 month, years)  Frequency: how often pain is an issue (e.g., several times per day, once every few days, constant)     FUNCTIONAL ASSESSMENT:     Palliative Performance Scale (PPS):          PSYCHOSOCIAL/SPIRITUAL SCREENING:     Palliative IDT has assessed this patient for cultural preferences / practices and a referral made as appropriate to needs (Cultural Services, Patient Advocacy, Ethics, etc.)    Any spiritual / Mormonism concerns:  [] Yes /  [x] No   If \"Yes\" to discuss with pastoral care during IDT     Does caregiver feel burdened by caring for their loved one:   [] Yes /  [x] No /  [] No Caregiver Present/Available [] No Caregiver [] Pt Lives at Facility  If \"Yes\" to discuss with social work during IDT    Anticipatory grief assessment:   [x] Normal  / [] Maladaptive     If \"Maladaptive\" to discuss with social work during IDT    ESAS Anxiety:      ESAS Depression:          REVIEW OF SYSTEMS:     Positive and pertinent negative findings in ROS are noted above in HPI. The following systems were [x] reviewed / [] unable to be reviewed as noted in HPI  Other findings are noted below.   Systems: constitutional, ears/nose/mouth/throat, respiratory, gastrointestinal, genitourinary, musculoskeletal, integumentary, neurologic, psychiatric, endocrine. Positive findings noted below. Modified ESAS Completed by: provider                                            PHYSICAL EXAM:     From RN flowsheet:  Wt Readings from Last 3 Encounters:   23 139 lb 8.8 oz (63.3 kg)   22 128 lb 15.5 oz (58.5 kg)   12/15/18 123 lb (55.8 kg)     Blood pressure (!) 181/143, pulse 70, temperature 98.1 °F (36.7 °C), resp. rate 20, height 4' 9\" (1.448 m), weight 139 lb 8.8 oz (63.3 kg), SpO2 95 %, not currently breastfeeding. Pain Scale 1: Adult Nonverbal Pain Scale                    Last bowel movement, if known:     Constitutional: sleeping, awakes to voice, in no acute distress, appears quite frail  Eyes: closed  Respiratory: breathing not labored, symmetric  Musculoskeletal: no deformity, no tenderness to palpation  Skin: warm, dry  Other:       HISTORY:     Active Problems:    Hemorrhagic shock ([facility] [address] (2023)      Past Medical History:   Diagnosis Date    BBB (bundle branch block)     Heart failure ([address]     followed by Dr Sanford Stoner      Past Surgical History:   Procedure Laterality Date    CARDIAC SURG PROCEDURE UNLIST      stent placed     HX APPENDECTOMY      HX  SECTION      x 3    HX HEENT      both ears    HX PACEMAKER        No family history on file. History reviewed, no pertinent family history.   Social History     Tobacco Use    Smoking status: Never    Smokeless tobacco: Not on file   Substance Use Topics    Alcohol use: No     Allergies   Allergen Reactions    Adhesive Rash    Clindamycin Rash    Hydrocodone Other (comments)     \"felt loopy\"    Pcn [Penicillins] Rash      Current Facility-Administered Medications   Medication Dose Route Frequency    balsam peru-castor oiL (VENELEX) ointment   Topical BID    0.9% sodium chloride infusion 250 mL  250 mL IntraVENous PRN    sodium chloride (NS) flush 5-40 mL  5-40 mL IntraVENous Q8H    sodium chloride (NS) flush 5-40 mL  5-40 mL IntraVENous PRN    acetaminophen (TYLENOL) tablet 650 mg  650 mg Oral Q6H PRN    Or    acetaminophen (TYLENOL) suppository 650 mg  650 mg Rectal Q6H PRN    polyethylene glycol (MIRALAX) packet 17 g  17 g Oral DAILY PRN    ondansetron (ZOFRAN) injection 4 mg  4 mg IntraVENous Q6H PRN    morphine injection 1 mg  1 mg IntraVENous Q2H PRN          LAB AND IMAGING FINDINGS:     Lab Results   Component Value Date/Time    WBC 15.6 (H) 04/06/2023 02:04 AM    HGB 11.8 04/06/2023 02:04 AM    PLATELET 617 07/87/7997 02:04 AM     Lab Results   Component Value Date/Time    Sodium 133 (L) 04/06/2023 02:04 AM    Potassium 4.6 04/06/2023 02:04 AM    Chloride 106 04/06/2023 02:04 AM    CO2 20 (L) 04/06/2023 02:04 AM    BUN 78 (H) 04/06/2023 02:04 AM    Creatinine 1.65 (H) 04/06/2023 02:04 AM    Calcium 7.7 (L) 04/06/2023 02:04 AM    Magnesium 2.4 04/06/2023 02:04 AM    Phosphorus 3.8 04/06/2023 02:04 AM      Lab Results   Component Value Date/Time    Alk.  phosphatase 118 (H) 04/06/2023 02:04 AM    Protein, total 5.6 (L) 04/06/2023 02:04 AM    Albumin 2.4 (L) 04/06/2023 02:04 AM    Globulin 3.2 04/06/2023 02:04 AM     Lab Results   Component Value Date/Time    INR 2.9 (H) 04/05/2023 05:39 PM    Prothrombin time 28.0 (H) 04/05/2023 05:39 PM    aPTT 26.1 04/06/2012 08:15 AM      No results found for: IRON, FE, TIBC, IBCT, PSAT, FERR   No results found for: PH, PCO2, PO2  No components found for: [place]   Lab Results   Component Value Date/Time     09/21/2022 10:17 AM    CK - MB 0.6 04/06/2012 08:15 AM

## 2023-04-06 NOTE — PROGRESS NOTES
Transition of Care Plan:     RUR:14%     Disposition:Home with family and hospice services     If SNF or IPR: Date FOC offered:N/A     Date [facility] received:N/A     Date authorization started with reference number:N/A     Date authorization received and expires:N/A     Accepting facility:N/A     Follow up appointments:N/A     DME needed:None     Transportation at Discharge: [facility]        Keys or means to access home:  Family has keys        IM Medicare Letter:Given 4/5/23     Is patient a  and connected with the [place]? No                If yes, was [employer] transfer form completed and VA notified? N/A      Liason with [address] [place] [address] has spoken with family. Hospice to have equipment delivered and they request amr set up to take the patient home tomorrow 4/7/23 at 1000am Spoke with Ruby Spring at [facility] and she has confirmed the pickup time for tomorrow 4/7/23 at 1000am  PCS form with medicals given to nursing. Lilian Zamora  RN BSN CRM        [phone]

## 2023-04-06 NOTE — PROGRESS NOTES
1406:  Addendum  Plan for patient to transition to comfort measures and home with hospice services tomorrow. Nothing further to add from a GI standpoint. GI signing-off. Please call with any questions. Thank you for this consult. GI PROGRESS NOTE  Cranford Aschoff, NP  [phone] NP in-hospital cell phone M-F until 4:30  After 5pm or on weekends, please call  for physician on call    Jeanenne Sandifer :1/15/1931 EEM:[id number]   ATTG: Dr. Osvaldo Mujica  PCP: Clarence Thornton MD  Date/Time:  2023 10:15 AM   Primary GI: Dr. Ronal Essex  Reason for following: Acute anemia     Assessment:   Acute blood loss anemia  Rectal bleeding   Hgb up to 11.8 from 4.6 on admission   CT abd/pel:  Acute GI bleed in the cecum, which is located in the left lower quadrant of the abdomen. Background of moderate generalized constipation. Diverticulosis of the colon. No acute diverticulitis. S/p arteriography of SMA, right colic, ileocolic arteries; no active bleeding      Hypotension- improving   SOB   IV pressors stopped      Atrial fibrillation   On Xarelto- last dose 2023    Plan:   Monitor GI output; follow for signs of bleeding  If overt re-bleeding would recommend stat CTA; if positive would re-consult IR for possible embolization   Serial H&H; goal for hgb >7.0  Hold Xarelto  Diet per primary team   Continue ICU level care  *Plan of care discussed with Dr. Ronal Essex     Subjective:   Discussed with RN events overnight. Patient resting comfortably in bed. Son at bedside. Review of Systems:  Symptom Y/N Comments  Symptom Y/N Comments   Fever/Chills n   Chest Pain n    Cough n   Headaches n    Sputum n   Joint Pain n    SOB/GAITAN n   Pruritis/Rash n    Tolerating Diet    Other       Could NOT obtain due to:      Objective:   VITALS:   Last 24hrs VS reviewed since prior progress note. Most recent are:  Visit Vitals  BP (!) 115/58 (BP 1 Location: Right lower arm, BP Patient Position:  At rest;Semi fowlers)   Pulse 70   Temp 97.7 °F (36.5 °C)   Resp 13   Ht 4' 9\" (1.448 m)   Wt 63.3 kg (139 lb 8.8 oz)   SpO2 95%   Breastfeeding No   BMI 30.20 kg/m²       Intake/Output Summary (Last 24 hours) at 4/6/2023 1015  Last data filed at 4/6/2023 0900  Gross per 24 hour   Intake 3022.7 ml   Output 706 ml   Net 2316.7 ml     PHYSICAL EXAM:  General: Elderly frail  female. NAD   HEENT: NC, Atraumatic. Anicteric sclerae. Lungs:  CTA Bilaterally. No Wheezing/Rhonchi/Rales. Heart:  Regular  rhythm,  No murmur, No Rubs, No Gallops  Abdomen: Soft, Non distended, Non tender. +Bowel sounds, no HSM  Extremities: No c/c/e  Neurologic:  No acute neurological distress   Psych:   Fair insight. Not anxious nor agitated. Lab and Radiology Data Reviewed: (see below)    Medications Reviewed: (see below)  PMH/SH reviewed - no change compared to H&P  ________________________________________________________________________  Total time spent with patient: 15 minutes ________________________________________________________________________  Care Plan discussed with:  Patient x   Family  Son    RN x              Consultant:       Maycol Villa NP     Procedures: see electronic medical records for all procedures/Xrays and details which were not copied into this note but were reviewed prior to creation of Plan.       LABS:  Recent Labs     04/06/23  0204 04/05/23  2138 04/05/23  1739   WBC 15.6*  --  9.9   HGB 11.8 11.8 11.1*   HCT 34.9*  --  33.7*     --  140*     Recent Labs     04/06/23 0204 04/05/23 1739 04/05/23  1132   * 134* 131*   K 4.6 4.3 4.2    106 96*   CO2 20* 18* 22   BUN 78* 76* 88*   CREA 1.65* 1.37* 1.87*   * 161* 256*   CA 7.7* 7.1* 8.4*   MG 2.4 2.2  --    PHOS 3.8 3.6  --      Recent Labs     04/06/23 0204 04/05/23 1739 04/05/23  1132   * 113 94   TP 5.6* 5.0* 5.8*   ALB 2.4* 2.1* 2.4*   GLOB 3.2 2.9 3.4     Recent Labs     04/05/23 1739   INR 2.9*   PTP 28.0* No results for input(s): FE, TIBC, PSAT, FERR in the last 72 hours. No results found for: FOL, RBCF  No results for input(s): PH, PCO2, PO2 in the last 72 hours. No results for input(s): CPK, CKMB in the last 72 hours.     No lab exists for component: TROPONINI  Lab Results   Component Value Date/Time    Color YELLOW/STRAW 09/21/2022 02:35 PM    Appearance CLEAR 09/21/2022 02:35 PM    Specific gravity 1.016 09/21/2022 02:35 PM    pH (UA) 5.5 09/21/2022 02:35 PM    Protein Negative 09/21/2022 02:35 PM    Glucose Negative 09/21/2022 02:35 PM    Ketone Negative 09/21/2022 02:35 PM    Bilirubin Negative 09/21/2022 02:35 PM    Urobilinogen 0.2 09/21/2022 02:35 PM    Nitrites Negative 09/21/2022 02:35 PM    Leukocyte Esterase TRACE (A) 09/21/2022 02:35 PM    Epithelial cells FEW 09/21/2022 02:35 PM    Bacteria Negative 09/21/2022 02:35 PM    WBC 0-4 09/21/2022 02:35 PM    RBC 0-5 09/21/2022 02:35 PM       MEDICATIONS:  Current Facility-Administered Medications   Medication Dose Route Frequency    balsam peru-castor oiL (VENELEX) ointment   Topical BID    NOREPINephrine (LEVOPHED) 8 mg in 5% dextrose 250mL (32 mcg/mL) infusion  0.5-16 mcg/min IntraVENous TITRATE    0.9% sodium chloride infusion 250 mL  250 mL IntraVENous PRN    sodium chloride (NS) flush 5-40 mL  5-40 mL IntraVENous Q8H    sodium chloride (NS) flush 5-40 mL  5-40 mL IntraVENous PRN    acetaminophen (TYLENOL) tablet 650 mg  650 mg Oral Q6H PRN    Or    acetaminophen (TYLENOL) suppository 650 mg  650 mg Rectal Q6H PRN    polyethylene glycol (MIRALAX) packet 17 g  17 g Oral DAILY PRN    ondansetron (ZOFRAN ODT) tablet 4 mg  4 mg Oral Q8H PRN    Or    ondansetron (ZOFRAN) injection 4 mg  4 mg IntraVENous Q6H PRN    morphine injection 1 mg  1 mg IntraVENous Q2H PRN

## 2023-04-07 ENCOUNTER — HOME CARE VISIT (OUTPATIENT)
Dept: HOSPICE | Facility: HOSPICE | Age: 88
End: 2023-04-07
Payer: MEDICARE

## 2023-04-07 RX ADMIN — SODIUM CHLORIDE, PRESERVATIVE FREE 10 ML: 5 INJECTION INTRAVENOUS at 06:00

## 2023-04-07 RX ADMIN — MORPHINE SULFATE 1 MG: 2 INJECTION, SOLUTION INTRAMUSCULAR; INTRAVENOUS at 09:42

## 2023-04-07 RX ADMIN — CASTOR OIL AND BALSAM, PERU: 788; 87 OINTMENT TOPICAL at 09:00

## 2023-04-07 RX ADMIN — MORPHINE SULFATE 1 MG: 2 INJECTION, SOLUTION INTRAMUSCULAR; INTRAVENOUS at 08:56

## 2023-04-07 NOTE — PROGRESS NOTES
1900 Bedside report received from Mitchell/BREANNA Le    2000 Assessment complete. 2200 Patient stop pulling all leads. 0000 Assessment complete.     0400 Assessment complete.    0700 Bedside report given to Scooter Hunt

## 2023-04-08 ENCOUNTER — HOME CARE VISIT (OUTPATIENT)
Dept: HOSPICE | Facility: HOSPICE | Age: 88
End: 2023-04-08
Payer: MEDICARE

## 2023-04-09 ENCOUNTER — HOME CARE VISIT (OUTPATIENT)
Dept: HOSPICE | Facility: HOSPICE | Age: 88
End: 2023-04-09
Payer: MEDICARE

## (undated) LAB
ALBUMIN SERPL-MCNC: 2.4 G/DL (ref 3.5–5)
ALBUMIN/GLOB SERPL: 0.8 (ref 1.1–2.2)
ALP SERPL-CCNC: 118 U/L (ref 45–117)
ALT SERPL-CCNC: 103 U/L (ref 12–78)
ANION GAP SERPL CALC-SCNC: 7 MMOL/L (ref 5–15)
AST SERPL-CCNC: 207 U/L (ref 15–37)
BILIRUB SERPL-MCNC: 1.8 MG/DL (ref 0.2–1)
BUN SERPL-MCNC: 78 MG/DL (ref 6–20)
BUN/CREAT SERPL: 47 (ref 12–20)
CALCIUM SERPL-MCNC: 7.7 MG/DL (ref 8.5–10.1)
CHLORIDE SERPL-SCNC: 106 MMOL/L (ref 97–108)
CO2 SERPL-SCNC: 20 MMOL/L (ref 21–32)
CREAT SERPL-MCNC: 1.65 MG/DL (ref 0.55–1.02)
ERYTHROCYTE [DISTWIDTH] IN BLOOD BY AUTOMATED COUNT: 19.9 % (ref 11.5–14.5)
GLOBULIN SER CALC-MCNC: 3.2 G/DL (ref 2–4)
GLUCOSE SERPL-MCNC: 197 MG/DL (ref 65–100)
HCT VFR BLD AUTO: 34.9 % (ref 35–47)
HGB BLD-MCNC: 11.6 G/DL (ref 11.5–16)
HGB BLD-MCNC: 11.8 G/DL (ref 11.5–16)
MAGNESIUM SERPL-MCNC: 2.4 MG/DL (ref 1.6–2.4)
MCH RBC QN AUTO: 27.3 PG (ref 26–34)
MCHC RBC AUTO-ENTMCNC: 33.8 G/DL (ref 30–36.5)
MCV RBC AUTO: 80.6 FL (ref 80–99)
NRBC # BLD: 0.39 K/UL (ref 0–0.01)
NRBC BLD-RTO: 2.5 PER 100 WBC
PHOSPHATE SERPL-MCNC: 3.8 MG/DL (ref 2.6–4.7)
PLATELET # BLD AUTO: 170 K/UL (ref 150–400)
PMV BLD AUTO: 11 FL (ref 8.9–12.9)
POTASSIUM SERPL-SCNC: 4.6 MMOL/L (ref 3.5–5.1)
PROT SERPL-MCNC: 5.6 G/DL (ref 6.4–8.2)
RBC # BLD AUTO: 4.33 M/UL (ref 3.8–5.2)
SODIUM SERPL-SCNC: 133 MMOL/L (ref 136–145)
WBC # BLD AUTO: 15.6 K/UL (ref 3.6–11)